# Patient Record
Sex: FEMALE | Race: WHITE | NOT HISPANIC OR LATINO | Employment: UNEMPLOYED | URBAN - NONMETROPOLITAN AREA
[De-identification: names, ages, dates, MRNs, and addresses within clinical notes are randomized per-mention and may not be internally consistent; named-entity substitution may affect disease eponyms.]

---

## 2022-02-14 ENCOUNTER — OFFICE VISIT (OUTPATIENT)
Dept: FAMILY MEDICINE CLINIC | Facility: CLINIC | Age: 32
End: 2022-02-14

## 2022-02-14 ENCOUNTER — LAB (OUTPATIENT)
Dept: LAB | Facility: HOSPITAL | Age: 32
End: 2022-02-14

## 2022-02-14 ENCOUNTER — PATIENT ROUNDING (BHMG ONLY) (OUTPATIENT)
Dept: FAMILY MEDICINE CLINIC | Facility: CLINIC | Age: 32
End: 2022-02-14

## 2022-02-14 VITALS
HEIGHT: 65 IN | BODY MASS INDEX: 22.89 KG/M2 | OXYGEN SATURATION: 98 % | HEART RATE: 62 BPM | TEMPERATURE: 97.4 F | SYSTOLIC BLOOD PRESSURE: 120 MMHG | WEIGHT: 137.4 LBS | DIASTOLIC BLOOD PRESSURE: 80 MMHG

## 2022-02-14 DIAGNOSIS — Z87.42 HISTORY OF OVARIAN CYST: ICD-10-CM

## 2022-02-14 DIAGNOSIS — Z87.59 HISTORY OF MISCARRIAGE: ICD-10-CM

## 2022-02-14 DIAGNOSIS — N97.9 INFERTILITY, FEMALE: ICD-10-CM

## 2022-02-14 DIAGNOSIS — N92.1 MENOMETRORRHAGIA: Primary | ICD-10-CM

## 2022-02-14 DIAGNOSIS — N94.6 DYSMENORRHEA: ICD-10-CM

## 2022-02-14 DIAGNOSIS — F43.29 STRESS AND ADJUSTMENT REACTION: ICD-10-CM

## 2022-02-14 DIAGNOSIS — N92.1 MENOMETRORRHAGIA: ICD-10-CM

## 2022-02-14 DIAGNOSIS — Z86.39 HISTORY OF IRON DEFICIENCY: ICD-10-CM

## 2022-02-14 LAB
ALBUMIN SERPL-MCNC: 4.4 G/DL (ref 3.5–5.2)
ALBUMIN/GLOB SERPL: 2.1 G/DL
ALP SERPL-CCNC: 58 U/L (ref 39–117)
ALT SERPL W P-5'-P-CCNC: 14 U/L (ref 1–33)
ANION GAP SERPL CALCULATED.3IONS-SCNC: 7.1 MMOL/L (ref 5–15)
AST SERPL-CCNC: 15 U/L (ref 1–32)
B-HCG UR QL: NEGATIVE
BASOPHILS # BLD AUTO: 0.05 10*3/MM3 (ref 0–0.2)
BASOPHILS NFR BLD AUTO: 0.9 % (ref 0–1.5)
BILIRUB SERPL-MCNC: 0.2 MG/DL (ref 0–1.2)
BILIRUB UR QL STRIP: NEGATIVE
BUN SERPL-MCNC: 12 MG/DL (ref 6–20)
BUN/CREAT SERPL: 17.1 (ref 7–25)
CALCIUM SPEC-SCNC: 9.3 MG/DL (ref 8.6–10.5)
CHLORIDE SERPL-SCNC: 106 MMOL/L (ref 98–107)
CLARITY UR: CLEAR
CO2 SERPL-SCNC: 28.9 MMOL/L (ref 22–29)
COLOR UR: ABNORMAL
CREAT SERPL-MCNC: 0.7 MG/DL (ref 0.57–1)
DEPRECATED RDW RBC AUTO: 46.1 FL (ref 37–54)
EOSINOPHIL # BLD AUTO: 0.07 10*3/MM3 (ref 0–0.4)
EOSINOPHIL NFR BLD AUTO: 1.3 % (ref 0.3–6.2)
ERYTHROCYTE [DISTWIDTH] IN BLOOD BY AUTOMATED COUNT: 14.4 % (ref 12.3–15.4)
ESTRADIOL SERPL HS-MCNC: 458 PG/ML
FSH SERPL-ACNC: 3.27 MIU/ML
GFR SERPL CREATININE-BSD FRML MDRD: 96 ML/MIN/1.73
GLOBULIN UR ELPH-MCNC: 2.1 GM/DL
GLUCOSE SERPL-MCNC: 82 MG/DL (ref 65–99)
GLUCOSE UR STRIP-MCNC: NEGATIVE MG/DL
HCT VFR BLD AUTO: 33.9 % (ref 34–46.6)
HGB BLD-MCNC: 10.9 G/DL (ref 12–15.9)
HGB UR QL STRIP.AUTO: NEGATIVE
IMM GRANULOCYTES # BLD AUTO: 0.01 10*3/MM3 (ref 0–0.05)
IMM GRANULOCYTES NFR BLD AUTO: 0.2 % (ref 0–0.5)
IRON 24H UR-MRATE: 14 MCG/DL (ref 37–145)
IRON SATN MFR SERPL: 3 % (ref 20–50)
KETONES UR QL STRIP: ABNORMAL
LEUKOCYTE ESTERASE UR QL STRIP.AUTO: NEGATIVE
LH SERPL-ACNC: 7.43 MIU/ML
LYMPHOCYTES # BLD AUTO: 1.76 10*3/MM3 (ref 0.7–3.1)
LYMPHOCYTES NFR BLD AUTO: 32.7 % (ref 19.6–45.3)
MCH RBC QN AUTO: 28.5 PG (ref 26.6–33)
MCHC RBC AUTO-ENTMCNC: 32.2 G/DL (ref 31.5–35.7)
MCV RBC AUTO: 88.7 FL (ref 79–97)
MONOCYTES # BLD AUTO: 0.73 10*3/MM3 (ref 0.1–0.9)
MONOCYTES NFR BLD AUTO: 13.5 % (ref 5–12)
NEUTROPHILS NFR BLD AUTO: 2.77 10*3/MM3 (ref 1.7–7)
NEUTROPHILS NFR BLD AUTO: 51.4 % (ref 42.7–76)
NITRITE UR QL STRIP: NEGATIVE
NRBC BLD AUTO-RTO: 0 /100 WBC (ref 0–0.2)
PH UR STRIP.AUTO: 6 [PH] (ref 5–8)
PLATELET # BLD AUTO: 284 10*3/MM3 (ref 140–450)
PMV BLD AUTO: 10.6 FL (ref 6–12)
POTASSIUM SERPL-SCNC: 4.3 MMOL/L (ref 3.5–5.2)
PROGEST SERPL-MCNC: <0.05 NG/ML
PROLACTIN SERPL-MCNC: 12.7 NG/ML (ref 4.79–23.3)
PROT SERPL-MCNC: 6.5 G/DL (ref 6–8.5)
PROT UR QL STRIP: NEGATIVE
RBC # BLD AUTO: 3.82 10*6/MM3 (ref 3.77–5.28)
SODIUM SERPL-SCNC: 142 MMOL/L (ref 136–145)
SP GR UR STRIP: 1.02 (ref 1–1.03)
TIBC SERPL-MCNC: 402 MCG/DL (ref 298–536)
TRANSFERRIN SERPL-MCNC: 270 MG/DL (ref 200–360)
TSH SERPL DL<=0.05 MIU/L-ACNC: 3.79 UIU/ML (ref 0.27–4.2)
UROBILINOGEN UR QL STRIP: ABNORMAL
WBC NRBC COR # BLD: 5.39 10*3/MM3 (ref 3.4–10.8)

## 2022-02-14 PROCEDURE — 36415 COLL VENOUS BLD VENIPUNCTURE: CPT | Performed by: FAMILY MEDICINE

## 2022-02-14 PROCEDURE — 83540 ASSAY OF IRON: CPT

## 2022-02-14 PROCEDURE — 80053 COMPREHEN METABOLIC PANEL: CPT

## 2022-02-14 PROCEDURE — 84443 ASSAY THYROID STIM HORMONE: CPT | Performed by: FAMILY MEDICINE

## 2022-02-14 PROCEDURE — 81025 URINE PREGNANCY TEST: CPT

## 2022-02-14 PROCEDURE — 83001 ASSAY OF GONADOTROPIN (FSH): CPT | Performed by: FAMILY MEDICINE

## 2022-02-14 PROCEDURE — 81003 URINALYSIS AUTO W/O SCOPE: CPT

## 2022-02-14 PROCEDURE — 84146 ASSAY OF PROLACTIN: CPT | Performed by: FAMILY MEDICINE

## 2022-02-14 PROCEDURE — 82670 ASSAY OF TOTAL ESTRADIOL: CPT

## 2022-02-14 PROCEDURE — 85025 COMPLETE CBC W/AUTO DIFF WBC: CPT | Performed by: FAMILY MEDICINE

## 2022-02-14 PROCEDURE — 84144 ASSAY OF PROGESTERONE: CPT

## 2022-02-14 PROCEDURE — 99204 OFFICE O/P NEW MOD 45 MIN: CPT | Performed by: FAMILY MEDICINE

## 2022-02-14 PROCEDURE — 84466 ASSAY OF TRANSFERRIN: CPT

## 2022-02-14 PROCEDURE — 83002 ASSAY OF GONADOTROPIN (LH): CPT | Performed by: FAMILY MEDICINE

## 2022-02-14 PROCEDURE — 84402 ASSAY OF FREE TESTOSTERONE: CPT | Performed by: FAMILY MEDICINE

## 2022-02-14 PROCEDURE — 82627 DEHYDROEPIANDROSTERONE: CPT | Performed by: FAMILY MEDICINE

## 2022-02-14 RX ORDER — MULTIPLE VITAMINS W/ MINERALS TAB 9MG-400MCG
1 TAB ORAL DAILY
COMMUNITY

## 2022-02-14 NOTE — PROGRESS NOTES
February 14, 2022    Hello, may I speak with Lisa Manrique?    My name is Pam    I am  with [facility]  [address] [place] [id number] [phone].    Before we get started may I verify your date of birth? 1990    I am calling to officially welcome you to our practice and ask about your recent visit. Is this a good time to talk? yes    Tell me about your visit with us. What things went well?  very nice provider       We're always looking for ways to make our patients' experiences even better. Do you have recommendations on ways we may improve?  no    Overall were you satisfied with your first visit to our practice? yes       I appreciate you taking the time to speak with me today. Is there anything else I can do for you? no      Thank you, and have a great day.

## 2022-02-14 NOTE — PROGRESS NOTES
Chief Complaint  Establish Care (would like hormone check up)    Subjective        History of Present Illness  Lisa Manrique is a 31 y.o. female who presents to [facility] - new patient.    Lisa is here with some heavy bleeding and irregular periods. She is requesting some hormone checks. She has had IVF when she was living [place] in [place]. She is taking a multivitamin with minerals as her only medication. She has allergy to PENICILLIN.    Her  was 2019 and she gave birth to her son in [place]; she is not established here with an OB-GYN. On her family history, there is diabetes in her grandmother and kidney disease and obesity as well as high blood pressure in her father. Socially, she has never been a drinker or smoker and denies any use of any illicit drugs.    She reports being diagnosed with endometriosis at age 24, and described pain and missing work due to her menses. She was started on birth control at that time. The patient was found on ultrasound to have 1 large and 2 small ovarian cysts, and states the large cyst ruptured and approximately 1 year later one of the smaller ones ruptured. The patient stopped taking birth control 3 years later when she and her  wanted to start a family. While in [facility] she had a hysteroscopy and a hystosalpingogram and everything was fine. The patient notes she has always had regular cycles. She has not tracked ovulation because it causes too much stress. The patient has 1 child that is 3 years old. Her last of 4 embryos were implanted in 2021; none of them worked. . She reports at the beginning of 2021 she had a miscarriage at 5 weeks. The patient states she is taking a multivitamin and has not been iron deficient since high school.  She notes with this current cycle that 10 days after starting her period she is still having spotting.    Past Medical History:   Diagnosis Date   • Menstrual problem   "      Outpatient Medications Prior to Visit   Medication Sig Dispense Refill   • multivitamin with minerals (ONE-A-DAY WOMENS PO) Take 1 tablet by mouth Daily.       No facility-administered medications prior to visit.        Objective   Vital Signs:   /80 (BP Location: Left arm, Patient Position: Sitting, Cuff Size: Adult)   Pulse 62   Temp 97.4 °F (36.3 °C) (Temporal)   Ht 165.1 cm (65\")   Wt 62.3 kg (137 lb 6.4 oz)   SpO2 98%   BMI 22.86 kg/m²       Physical Exam  Vitals reviewed.   Constitutional:       Appearance: Normal appearance. She is not ill-appearing.      Comments: Slender woman   Eyes:      Extraocular Movements: Extraocular movements intact.      Conjunctiva/sclera: Conjunctivae normal.   Neck:      Comments: Normal thyroid exam  Cardiovascular:      Rate and Rhythm: Normal rate and regular rhythm.   Pulmonary:      Effort: Pulmonary effort is normal.      Breath sounds: Normal breath sounds.   Abdominal:      General: There is no distension.   Musculoskeletal:      Cervical back: No tenderness.      Comments: Gen MAEW   Lymphadenopathy:      Cervical: No cervical adenopathy.   Skin:     General: Skin is warm.      Coloration: Skin is not pale.   Neurological:      Mental Status: She is alert and oriented to person, place, and time.   Psychiatric:         Attention and Perception: Attention normal.         Mood and Affect: Mood is anxious and depressed. Affect is tearful.         Behavior: Behavior normal. Behavior is cooperative.         Thought Content: Thought content normal.         Cognition and Memory: Cognition and memory normal.         Judgment: Judgment normal.             Assessment and Plan    Diagnoses and all orders for this visit:    1. Menometrorrhagia (Primary)  -     Pregnancy, Urine - Urine, Clean Catch; Future  -     Urinalysis With Culture If Indicated - Urine, Clean Catch; Future  -     Estradiol; Future  -     Progesterone; Future  -     CBC auto differential  -     " DHEA-sulfate  -     Follicle stimulating hormone  -     Luteinizing hormone  -     Prolactin  -     Testosterone Free Direct  -     TSH  -     Comprehensive Metabolic Panel; Future    2. Dysmenorrhea  -     Pregnancy, Urine - Urine, Clean Catch; Future  -     Urinalysis With Culture If Indicated - Urine, Clean Catch; Future  -     Estradiol; Future  -     Progesterone; Future  -     CBC auto differential  -     DHEA-sulfate  -     Follicle stimulating hormone  -     Luteinizing hormone  -     Prolactin  -     Testosterone Free Direct  -     TSH  -     Comprehensive Metabolic Panel; Future    3. Infertility, female  -     Pregnancy, Urine - Urine, Clean Catch; Future  -     Urinalysis With Culture If Indicated - Urine, Clean Catch; Future  -     Estradiol; Future  -     Progesterone; Future  -     CBC auto differential  -     DHEA-sulfate  -     Follicle stimulating hormone  -     Luteinizing hormone  -     Prolactin  -     Testosterone Free Direct  -     TSH  -     Comprehensive Metabolic Panel; Future    4. History of miscarriage  -     Pregnancy, Urine - Urine, Clean Catch; Future  -     Urinalysis With Culture If Indicated - Urine, Clean Catch; Future  -     Estradiol; Future  -     Progesterone; Future  -     CBC auto differential  -     DHEA-sulfate  -     Follicle stimulating hormone  -     Luteinizing hormone  -     Prolactin  -     Testosterone Free Direct  -     TSH  -     Comprehensive Metabolic Panel; Future    5. Stress and adjustment reaction    6. History of ovarian cyst  -     Pregnancy, Urine - Urine, Clean Catch; Future  -     Urinalysis With Culture If Indicated - Urine, Clean Catch; Future  -     Estradiol; Future  -     Progesterone; Future  -     CBC auto differential  -     DHEA-sulfate  -     Follicle stimulating hormone  -     Luteinizing hormone  -     Prolactin  -     Testosterone Free Direct  -     TSH  -     Comprehensive Metabolic Panel; Future    7. History of iron deficiency  -      Iron Profile; Future    She will see Dr. Arnett for OB/GYN evaluation. I will forward her the lab results in the mail so she can take them with her to her appointment. If there are any issues as far as thyroid or iron deficiency or other medical issues, she will follow up here as directed and as indicated by the lab results. It is unclear if she has endometriosis. The status of her ovarian cyst is unknown and she will likely need ultrasound. I have urged her to continue her counseling that she has been doing online through a [employer] group, and I have told her to follow up here as needed for any acute anxiety or persistent depressive symptoms, especially in the case of miscarriage in the future.       I spent 45 minutes caring for Lisa on this date of service. This time includes time spent by me in the following activities:performing a medically appropriate examination and/or evaluation , counseling and educating the patient/family/caregiver, ordering medications, tests, or procedures, referring and communicating with other health care professionals  and care coordination    Follow Up   Return if symptoms worsen or fail to improve.    Patient was given instructions and counseling regarding her condition or for health maintenance advice.        Please see specific information/handouts pulled into the AVS if appropriate.       Una Arroyo M.D.  [facility]   Family Medicine    Transcribed from ambient dictation for Una Arroyo MD by Shannon Dietz.  02/14/22   10:42 CST    Patient verbalized consent to the visit recording.

## 2022-02-15 LAB
DHEA-S SERPL-MCNC: 69.8 UG/DL (ref 84.8–378)
TESTOST FREE SERPL-MCNC: 0.3 PG/ML (ref 0–4.2)

## 2022-05-24 ENCOUNTER — TRANSCRIBE ORDERS (OUTPATIENT)
Dept: ADMINISTRATIVE | Facility: HOSPITAL | Age: 32
End: 2022-05-24

## 2022-05-24 DIAGNOSIS — Z31.41 FERTILITY TESTING: Primary | ICD-10-CM

## 2022-05-25 ENCOUNTER — APPOINTMENT (OUTPATIENT)
Dept: GENERAL RADIOLOGY | Facility: HOSPITAL | Age: 32
End: 2022-05-25

## 2022-05-31 ENCOUNTER — HOSPITAL ENCOUNTER (OUTPATIENT)
Dept: GENERAL RADIOLOGY | Facility: HOSPITAL | Age: 32
Discharge: HOME | End: 2022-05-31
Admitting: OBSTETRICS & GYNECOLOGY

## 2022-05-31 DIAGNOSIS — Z31.41 FERTILITY TESTING: ICD-10-CM

## 2022-05-31 PROCEDURE — 25010000002 IOPAMIDOL 61 % SOLUTION: Performed by: OBSTETRICS & GYNECOLOGY

## 2022-05-31 PROCEDURE — 74740 X-RAY FEMALE GENITAL TRACT: CPT

## 2022-05-31 RX ADMIN — IOPAMIDOL 15 ML: 612 INJECTION, SOLUTION INTRATHECAL at 08:35

## 2023-08-03 ENCOUNTER — LAB (OUTPATIENT)
Dept: LAB | Facility: HOSPITAL | Age: 33
End: 2023-08-03
Payer: COMMERCIAL

## 2023-08-03 DIAGNOSIS — N92.1 MENOMETRORRHAGIA: ICD-10-CM

## 2023-08-03 DIAGNOSIS — Z00.00 ANNUAL PHYSICAL EXAM: ICD-10-CM

## 2023-08-03 DIAGNOSIS — N97.9 INFERTILITY, FEMALE: ICD-10-CM

## 2023-08-03 LAB
ALBUMIN SERPL-MCNC: 4.6 G/DL (ref 3.5–5.2)
ALBUMIN/GLOB SERPL: 2.2 G/DL
ALP SERPL-CCNC: 47 U/L (ref 39–117)
ALT SERPL W P-5'-P-CCNC: 12 U/L (ref 1–33)
ANION GAP SERPL CALCULATED.3IONS-SCNC: 10 MMOL/L (ref 5–15)
AST SERPL-CCNC: 13 U/L (ref 1–32)
BASOPHILS # BLD AUTO: 0.06 10*3/MM3 (ref 0–0.2)
BASOPHILS NFR BLD AUTO: 0.8 % (ref 0–1.5)
BILIRUB SERPL-MCNC: 0.4 MG/DL (ref 0–1.2)
BUN SERPL-MCNC: 15 MG/DL (ref 6–20)
BUN/CREAT SERPL: 22.1 (ref 7–25)
CALCIUM SPEC-SCNC: 9.3 MG/DL (ref 8.6–10.5)
CHLORIDE SERPL-SCNC: 103 MMOL/L (ref 98–107)
CHOLEST SERPL-MCNC: 145 MG/DL (ref 0–200)
CO2 SERPL-SCNC: 27 MMOL/L (ref 22–29)
CREAT SERPL-MCNC: 0.68 MG/DL (ref 0.57–1)
DEPRECATED RDW RBC AUTO: 45.4 FL (ref 37–54)
EGFRCR SERPLBLD CKD-EPI 2021: 117.4 ML/MIN/1.73
EOSINOPHIL # BLD AUTO: 0.07 10*3/MM3 (ref 0–0.4)
EOSINOPHIL NFR BLD AUTO: 1 % (ref 0.3–6.2)
ERYTHROCYTE [DISTWIDTH] IN BLOOD BY AUTOMATED COUNT: 12.8 % (ref 12.3–15.4)
GLOBULIN UR ELPH-MCNC: 2.1 GM/DL
GLUCOSE SERPL-MCNC: 142 MG/DL (ref 65–99)
HBA1C MFR BLD: 5 % (ref 4.8–5.6)
HCT VFR BLD AUTO: 35.4 % (ref 34–46.6)
HDLC SERPL-MCNC: 74 MG/DL (ref 40–60)
HGB BLD-MCNC: 11.1 G/DL (ref 12–15.9)
IMM GRANULOCYTES # BLD AUTO: 0.03 10*3/MM3 (ref 0–0.05)
IMM GRANULOCYTES NFR BLD AUTO: 0.4 % (ref 0–0.5)
LDLC SERPL CALC-MCNC: 61 MG/DL (ref 0–100)
LDLC/HDLC SERPL: 0.84 {RATIO}
LYMPHOCYTES # BLD AUTO: 2.64 10*3/MM3 (ref 0.7–3.1)
LYMPHOCYTES NFR BLD AUTO: 36.8 % (ref 19.6–45.3)
MCH RBC QN AUTO: 30.2 PG (ref 26.6–33)
MCHC RBC AUTO-ENTMCNC: 31.4 G/DL (ref 31.5–35.7)
MCV RBC AUTO: 96.2 FL (ref 79–97)
MONOCYTES # BLD AUTO: 0.47 10*3/MM3 (ref 0.1–0.9)
MONOCYTES NFR BLD AUTO: 6.5 % (ref 5–12)
NEUTROPHILS NFR BLD AUTO: 3.91 10*3/MM3 (ref 1.7–7)
NEUTROPHILS NFR BLD AUTO: 54.5 % (ref 42.7–76)
NRBC BLD AUTO-RTO: 0 /100 WBC (ref 0–0.2)
PLATELET # BLD AUTO: 271 10*3/MM3 (ref 140–450)
PMV BLD AUTO: 9.5 FL (ref 6–12)
POTASSIUM SERPL-SCNC: 4.1 MMOL/L (ref 3.5–5.2)
PROT SERPL-MCNC: 6.7 G/DL (ref 6–8.5)
RBC # BLD AUTO: 3.68 10*6/MM3 (ref 3.77–5.28)
SODIUM SERPL-SCNC: 140 MMOL/L (ref 136–145)
T4 FREE SERPL-MCNC: 1.24 NG/DL (ref 0.93–1.7)
TRIGL SERPL-MCNC: 45 MG/DL (ref 0–150)
TSH SERPL DL<=0.05 MIU/L-ACNC: 2.26 UIU/ML (ref 0.27–4.2)
VLDLC SERPL-MCNC: 10 MG/DL (ref 5–40)
WBC NRBC COR # BLD: 7.18 10*3/MM3 (ref 3.4–10.8)

## 2023-08-03 PROCEDURE — 83036 HEMOGLOBIN GLYCOSYLATED A1C: CPT

## 2023-08-03 PROCEDURE — 82670 ASSAY OF TOTAL ESTRADIOL: CPT

## 2023-08-03 PROCEDURE — 82626 DEHYDROEPIANDROSTERONE: CPT

## 2023-08-03 PROCEDURE — 80053 COMPREHEN METABOLIC PANEL: CPT

## 2023-08-03 PROCEDURE — 83001 ASSAY OF GONADOTROPIN (FSH): CPT

## 2023-08-03 PROCEDURE — 83002 ASSAY OF GONADOTROPIN (LH): CPT

## 2023-08-03 PROCEDURE — 84403 ASSAY OF TOTAL TESTOSTERONE: CPT

## 2023-08-03 PROCEDURE — 84443 ASSAY THYROID STIM HORMONE: CPT

## 2023-08-03 PROCEDURE — 85025 COMPLETE CBC W/AUTO DIFF WBC: CPT

## 2023-08-03 PROCEDURE — 84146 ASSAY OF PROLACTIN: CPT

## 2023-08-03 PROCEDURE — 84439 ASSAY OF FREE THYROXINE: CPT

## 2023-08-03 PROCEDURE — 80061 LIPID PANEL: CPT

## 2023-08-03 PROCEDURE — 36415 COLL VENOUS BLD VENIPUNCTURE: CPT

## 2023-08-04 LAB
ESTRADIOL SERPL HS-MCNC: 192 PG/ML
FSH SERPL-ACNC: 7.53 MIU/ML
LH SERPL-ACNC: 31.4 MIU/ML
PROLACTIN SERPL-MCNC: 24.2 NG/ML (ref 4.79–23.3)
TESTOST SERPL-MCNC: 17.9 NG/DL (ref 8.4–48.1)

## 2023-08-09 LAB — DHEA SERPL-MCNC: 237 NG/DL (ref 31–701)

## 2023-09-05 ENCOUNTER — OFFICE VISIT (OUTPATIENT)
Dept: OBSTETRICS AND GYNECOLOGY | Facility: CLINIC | Age: 33
End: 2023-09-05
Payer: COMMERCIAL

## 2023-09-05 VITALS
HEIGHT: 65 IN | WEIGHT: 134 LBS | DIASTOLIC BLOOD PRESSURE: 62 MMHG | BODY MASS INDEX: 22.33 KG/M2 | SYSTOLIC BLOOD PRESSURE: 100 MMHG

## 2023-09-05 DIAGNOSIS — N97.9 SECONDARY FEMALE INFERTILITY: Primary | ICD-10-CM

## 2023-09-05 PROCEDURE — 99203 OFFICE O/P NEW LOW 30 MIN: CPT | Performed by: OBSTETRICS & GYNECOLOGY

## 2023-09-05 NOTE — PROGRESS NOTES
"Lisa Manrique is a 32 y.o. female here today to discuss treatment of infertility.  She has had 1 live birth, conceived with IVF.  Her most recent pregnancy was a early first trimester miscarriage in April 2021.  Several years ago, she was presumptively diagnosed with endometriosis, but has not had laparoscopy.  She moved to [facility] January 2022, and saw another provider in [facility].  She reportedly had a fertility evaluation including normal semen analysis and HSG.  I have been able to review electronic records showing a normal HSG May 2022.  Her PCP ordered randomly timed labs, and I have been able to review those results from August 3.  She was midcycle at the time, and TSH, FSH, LH, estradiol, and progesterone were normal or as expected given the time of the menstrual cycle.  Her prolactin was mildly elevated, but has previously been normal.  It has also been normal on repeat.    Visit Vitals  /62 (BP Location: Right arm, Patient Position: Sitting, Cuff Size: Adult)   Ht 165.1 cm (65\")   Wt 60.8 kg (134 lb)   Breastfeeding No   BMI 22.30 kg/m²     Pleasant female no acute distress  Mood and affect normal  Breathing unlabored    She reports a positive home ovulation predictor kit on August 31    Assessment: Secondary female infertility    Given her stage of the menstrual cycle, she will return in 3 days for a progesterone level to assess ovulation.  She will then return on cycle day 3 for FSH, estradiol, and AMH.  She will return to the office in 3 weeks to review results and discuss possible therapy.  This may include ovulation augmentation with Femara, luteal phase progesterone supplementation, or other treatment.  Since she has required IVF in the past, we discussed the possibility of returning to reproductive endocrinology for further fertility treatment.  Call with questions or concerns.           "

## 2023-09-08 ENCOUNTER — LAB (OUTPATIENT)
Dept: LAB | Facility: HOSPITAL | Age: 33
End: 2023-09-08
Payer: COMMERCIAL

## 2023-09-08 ENCOUNTER — TELEPHONE (OUTPATIENT)
Dept: OBSTETRICS AND GYNECOLOGY | Facility: CLINIC | Age: 33
End: 2023-09-08
Payer: COMMERCIAL

## 2023-09-08 DIAGNOSIS — N97.9 SECONDARY FEMALE INFERTILITY: Primary | ICD-10-CM

## 2023-09-08 PROCEDURE — 36415 COLL VENOUS BLD VENIPUNCTURE: CPT

## 2023-09-08 PROCEDURE — 84144 ASSAY OF PROGESTERONE: CPT | Performed by: OBSTETRICS & GYNECOLOGY

## 2023-09-09 LAB — PROGEST SERPL-MCNC: 37.7 NG/ML

## 2023-09-11 ENCOUNTER — TELEPHONE (OUTPATIENT)
Dept: OBSTETRICS AND GYNECOLOGY | Facility: CLINIC | Age: 33
End: 2023-09-11
Payer: COMMERCIAL

## 2023-09-11 DIAGNOSIS — N97.9 FEMALE INFERTILITY: Primary | ICD-10-CM

## 2023-09-11 NOTE — TELEPHONE ENCOUNTER
Pt left v/m with questions regarding her Progesterone level. Pt states on her v/m that we can leave a detailed message if she doesn't answer. Pt did not answer on call back so I left a v/m stating per Dr. Cheng, her Progesterone level was great and for her to return on CD 3 for additional labs. Pt advised to call office with any further questions.

## 2023-09-11 NOTE — TELEPHONE ENCOUNTER
Pt called back with questions related to her Progesterone results. I answered pt's questions accordingly and advised her to come in for additional labs on CD 3

## 2023-09-21 LAB
ESTRADIOL SERPL-MCNC: 69.3 PG/ML
FSH SERPL-ACNC: 6.3 MIU/ML
MIS SERPL-MCNC: 1.09 NG/ML

## 2023-10-09 ENCOUNTER — OFFICE VISIT (OUTPATIENT)
Dept: OBSTETRICS AND GYNECOLOGY | Facility: CLINIC | Age: 33
End: 2023-10-09
Payer: COMMERCIAL

## 2023-10-09 VITALS
DIASTOLIC BLOOD PRESSURE: 72 MMHG | SYSTOLIC BLOOD PRESSURE: 114 MMHG | BODY MASS INDEX: 21.66 KG/M2 | WEIGHT: 130 LBS | HEIGHT: 65 IN

## 2023-10-09 DIAGNOSIS — N97.9 SECONDARY FEMALE INFERTILITY: Primary | ICD-10-CM

## 2023-10-09 PROCEDURE — 99213 OFFICE O/P EST LOW 20 MIN: CPT | Performed by: OBSTETRICS & GYNECOLOGY

## 2023-10-09 NOTE — PROGRESS NOTES
"Lisa Manrique is a 32 y.o. female here today for follow-up of infertility.  Day 21 progesterone showed good evidence of ovulation.  Day 3 labs show good ovarian reserve.  She achieved her last pregnancy with IVF in [place].  Other serum labs have previously been normal.  Reported history of normal HSG and semen analysis as well.    Visit Vitals  /72   Ht 165.1 cm (65\")   Wt 59 kg (130 lb)   BMI 21.63 kg/mý     Pleasant female no acute distress  Mood and affect normal  Breathing unlabored    Assessment: Secondary female infertility    We had a fairly lengthy discussion about treatment options.  She required IVF to get pregnant previously, and we discussed the limitations of fertility treatments in a general gynecology office.  She may opt to see reproductive endocrinology at any time.  Options that we offer here include ovulation augmentation with intrauterine insemination, and potential ultrasound to follow follicular development.  She will consider her options, and discussed with her .  She will contact the office if she would like to move forward.  Call with questions or concerns.      "

## 2023-10-10 ENCOUNTER — PATIENT MESSAGE (OUTPATIENT)
Dept: OBSTETRICS AND GYNECOLOGY | Facility: CLINIC | Age: 33
End: 2023-10-10
Payer: COMMERCIAL

## 2023-10-11 ENCOUNTER — TELEPHONE (OUTPATIENT)
Dept: OBSTETRICS AND GYNECOLOGY | Facility: CLINIC | Age: 33
End: 2023-10-11
Payer: COMMERCIAL

## 2023-10-11 NOTE — TELEPHONE ENCOUNTER
I see there is a message forwarded to Dr. Cheng regarding this pt but she called this afternoon stating that today is day 1 of her cycle and they would like to proceed with IUI this cycle if possible and she didn't want to miss the window of taking any medication.

## 2023-10-12 RX ORDER — LETROZOLE 2.5 MG/1
5 TABLET, FILM COATED ORAL DAILY
Qty: 10 TABLET | Refills: 0 | Status: SHIPPED | OUTPATIENT
Start: 2023-10-12

## 2023-10-20 ENCOUNTER — PATIENT MESSAGE (OUTPATIENT)
Dept: OBSTETRICS AND GYNECOLOGY | Facility: CLINIC | Age: 33
End: 2023-10-20
Payer: COMMERCIAL

## 2023-10-20 RX ORDER — CHORIOGONADOTROPIN ALFA 250 UG/.5ML
250 INJECTION, SOLUTION SUBCUTANEOUS ONCE
Qty: 0.5 ML | Refills: 0 | Status: SHIPPED | OUTPATIENT
Start: 2023-10-20 | End: 2023-10-20

## 2023-10-20 NOTE — TELEPHONE ENCOUNTER
From: Lisa Manrique  Sent: 10/20/2023 2:33 PM CDT  To: [facility] Clinical Pool  Subject: follicle check    Yes, I should be able to. Thank you. Does it have instructions if there need to be any? And the injection will be ready for me to  today?

## 2023-10-23 ENCOUNTER — PROCEDURE VISIT (OUTPATIENT)
Dept: OBSTETRICS AND GYNECOLOGY | Facility: CLINIC | Age: 33
End: 2023-10-23
Payer: COMMERCIAL

## 2023-10-23 VITALS
HEIGHT: 65 IN | DIASTOLIC BLOOD PRESSURE: 74 MMHG | WEIGHT: 130 LBS | SYSTOLIC BLOOD PRESSURE: 108 MMHG | BODY MASS INDEX: 21.66 KG/M2

## 2023-10-23 DIAGNOSIS — N97.9 SECONDARY FEMALE INFERTILITY: Primary | ICD-10-CM

## 2023-10-23 RX ORDER — PROGESTERONE 200 MG/1
200 CAPSULE ORAL DAILY
Qty: 30 CAPSULE | Refills: 2 | Status: SHIPPED | OUTPATIENT
Start: 2023-10-23

## 2023-10-23 NOTE — PROGRESS NOTES
Sperm wash done, adequate sperm noted under the microscope. Pt is placed in stirrups and speculum inserted. Cervix is dilated with cytobrush. Sperm is slowly injected into the uterus. Pt is placed in trendelenburg for 45 minutes. Will wait 2 weeks before doing a pregnancy test.

## 2023-11-06 ENCOUNTER — TELEPHONE (OUTPATIENT)
Dept: OBSTETRICS AND GYNECOLOGY | Facility: CLINIC | Age: 33
End: 2023-11-06
Payer: COMMERCIAL

## 2023-11-06 NOTE — TELEPHONE ENCOUNTER
Pt had IUI in office 10/23/23. She had a negative UPT this morning but still has not started her cycle. Pt is asking if she should continue taking the Progesterone and test again in a couple of days or discontinue the Progesterone? Please advise

## 2023-11-08 DIAGNOSIS — N97.9 SECONDARY FEMALE INFERTILITY: Primary | ICD-10-CM

## 2023-11-08 NOTE — TELEPHONE ENCOUNTER
Pt had IUI on 10/23/23.  Pt called today after starting her period.  Pt is asking to repeat femara with this cycle and attempt IUI again.  Medication pended if appropriate.  I did advise pt that I will call and update her tomorrow 11/9/23.

## 2023-11-09 RX ORDER — LETROZOLE 2.5 MG/1
5 TABLET, FILM COATED ORAL DAILY
Qty: 10 TABLET | Refills: 0 | Status: SHIPPED | OUTPATIENT
Start: 2023-11-09

## 2023-11-17 ENCOUNTER — TELEPHONE (OUTPATIENT)
Dept: OBSTETRICS AND GYNECOLOGY | Facility: CLINIC | Age: 33
End: 2023-11-17
Payer: COMMERCIAL

## 2023-11-17 NOTE — TELEPHONE ENCOUNTER
Pt updated after discussion with Dr hCeng.  Dr Cheng advises pt to use ovulation predictor strips Saturday and Sunday and if positive to have intercourse.  Pt to update office on Monday morning if no positive test over the weekend, will evaluate the situation on Monday and update Dr Cheng.  Pt voiced understanding to all conversation.

## 2023-11-20 ENCOUNTER — PROCEDURE VISIT (OUTPATIENT)
Dept: OBSTETRICS AND GYNECOLOGY | Facility: CLINIC | Age: 33
End: 2023-11-20
Payer: COMMERCIAL

## 2023-11-20 ENCOUNTER — TELEPHONE (OUTPATIENT)
Dept: OBSTETRICS AND GYNECOLOGY | Facility: CLINIC | Age: 33
End: 2023-11-20
Payer: COMMERCIAL

## 2023-11-20 VITALS
DIASTOLIC BLOOD PRESSURE: 78 MMHG | WEIGHT: 132 LBS | SYSTOLIC BLOOD PRESSURE: 118 MMHG | HEIGHT: 65 IN | BODY MASS INDEX: 21.99 KG/M2

## 2023-11-20 DIAGNOSIS — Z31.89 ENCOUNTER FOR ARTIFICIAL INSEMINATION: Primary | ICD-10-CM

## 2023-11-20 NOTE — TELEPHONE ENCOUNTER
Per Lorraine, I called pt to see if she would be able to come in at 1:00pm this afternoon for IUI and there was no answer so I left a v/m

## 2023-11-20 NOTE — TELEPHONE ENCOUNTER
Pt called to update us regarding Friday's phone call(see TE). Pt states she had taken 2 OPK on Saturday, both results being 0.20. She took 2 on Sunday, the first being 0.17 and the second being 0.78. Pt advised, this information would get passed to Dr. Cheng and someone would give her a call back regarding his plan for IUI or other recommendations. Pt voiced understanding.

## 2023-11-20 NOTE — PROGRESS NOTES
Lisa Manrique is a 32 y.o. female here today for intrauterine insemination.  She had a positive LH surge yesterday by home urinary testing.  She is on Femara ovulation induction.  She has had her infertility workup through Dr. Cheng.  This is insemination #2.    The patient provided a fresh labeled semen sample for use for intrauterine insemination.  The semen sample was processed and washed using our standard laboratory procedure.  The specimen was allowed to liquefy for approximately 30 minutes, and was then labeled and transferred to a sterile centrifuge tube.  The volume was measured at 3.2 mL.  An equal volume of multipurpose handling medium was added to the centrifuge tube, and the sample mixed.  The sample was spun for 10 minutes, good pellet formation was noted, and the supernatant then discarded.  Another 2 mL of multipurpose handling medium was added to the centrifuge tube, and the specimen mixed.  The sample was spun for another 10 minutes, good pellet formation was noted, and the supernatant was then discarded.  0.3 mL multipurpose handling medium was then added to resuspend the pellett to be used for insemination.    The patient was placed in the lithotomy position on the exam table.  The cervix was visualized with a speculum.  The cervix was probed and found to be patent.  I have reviewed the slide prepared from the semen washings.  There are numerous mobile sperm noted in a fairly high concentration.  The prepared semen sample, of approximately 0.5cc, was drawn up in an insemination catheter and the catheter placed transcervically to the uterine fundus.  The entire sample was placed at the uterine fundus without difficulty.  The catheter and speculum were removed.  The patient was allowed to stay in a hips elevated position for 20 minutes prior to leaving the office.  She will notify the office in a couple of weeks if she has a period or a positive pregnancy test.

## 2023-12-05 DIAGNOSIS — N97.9 SECONDARY FEMALE INFERTILITY: Primary | ICD-10-CM

## 2023-12-05 RX ORDER — LETROZOLE 2.5 MG/1
5 TABLET, FILM COATED ORAL DAILY
Qty: 10 TABLET | Refills: 0 | Status: SHIPPED | OUTPATIENT
Start: 2023-12-05 | End: 2023-12-10

## 2023-12-05 NOTE — TELEPHONE ENCOUNTER
Pt called to report today is day 1 of cycle.  Pt asking for femara refill.   Pt also asking to schedule 10 day US for follicle check.  Please advise if appropriate for this cycle.

## 2023-12-14 ENCOUNTER — TELEPHONE (OUTPATIENT)
Dept: OBSTETRICS AND GYNECOLOGY | Facility: CLINIC | Age: 33
End: 2023-12-14
Payer: COMMERCIAL

## 2023-12-14 RX ORDER — CHORIOGONADOTROPIN ALFA 250 UG/.5ML
250 INJECTION, SOLUTION SUBCUTANEOUS ONCE
Qty: 0.5 ML | Refills: 0 | Status: SHIPPED | OUTPATIENT
Start: 2023-12-14 | End: 2023-12-15

## 2023-12-14 NOTE — TELEPHONE ENCOUNTER
Follicle check US reviewed with Dr Cheng. Pt to trigger tonight and IUI tomorrow. Pt has sterile cup for partner to collect at home. Pt given option of 8a appt or 1030a appt. She opts for 8a appt and will have partner collect around 715a so it is ready to process at appt time. Ovidrel sent to  pharmacy.

## 2023-12-15 ENCOUNTER — PROCEDURE VISIT (OUTPATIENT)
Dept: OBSTETRICS AND GYNECOLOGY | Facility: CLINIC | Age: 33
End: 2023-12-15
Payer: COMMERCIAL

## 2023-12-15 VITALS
DIASTOLIC BLOOD PRESSURE: 74 MMHG | SYSTOLIC BLOOD PRESSURE: 102 MMHG | HEIGHT: 65 IN | WEIGHT: 130 LBS | BODY MASS INDEX: 21.66 KG/M2

## 2023-12-15 DIAGNOSIS — Z31.89 ENCOUNTER FOR ARTIFICIAL INSEMINATION: Primary | ICD-10-CM

## 2023-12-15 NOTE — PROGRESS NOTES
Lisa Manrique is a 32 y.o. female here today for intrauterine insemination.  She had a mature follicle on US yesterday, and triggered last night.  She is on ovulation induction with Femara.  She has had her infertility workup through Dr. Cheng.  This is insemination #3.    The patient provided a fresh labeled semen sample for use for intrauterine insemination.  The semen sample was processed and washed using our standard laboratory procedure.  The specimen was allowed to liquefy for approximately 30 minutes, and was then labeled and transferred to a sterile centrifuge tube.  The volume was measured at 2.6 mL.  An equal volume of multipurpose handling medium was added to the centrifuge tube, and the sample mixed.  The sample was spun for 10 minutes, good pellet formation was noted, and the supernatant then discarded.  Another 2 mL of multipurpose handling medium was added to the centrifuge tube, and the specimen mixed.  The sample was spun for another 10 minutes, good pellet formation was noted, and the supernatant was then discarded.  0.3 mL multipurpose handling medium was then added to resuspend the pellett to be used for insemination.    The patient was placed in the lithotomy position on the exam table.  The cervix was visualized with a speculum.  The cervix was probed and found to be patent.  I have reviewed the slide prepared from the semen washings.  There are numerous mobile sperm noted in a fairly high concentration.  The prepared semen sample, of approximately 0.5cc, was drawn up in an insemination catheter and the catheter placed transcervically to the uterine fundus.  The entire sample was placed at the uterine fundus without difficulty.  The catheter and speculum were removed.  The patient was allowed to stay in a hips elevated position for 20 minutes prior to leaving the office.  She will notify the office in a couple of weeks if she has a period or a positive pregnancy test.

## 2024-01-26 DIAGNOSIS — N97.9 FEMALE INFERTILITY: Primary | ICD-10-CM

## 2024-01-26 RX ORDER — LETROZOLE 2.5 MG/1
5 TABLET, FILM COATED ORAL DAILY
Qty: 10 TABLET | Refills: 0 | Status: SHIPPED | OUTPATIENT
Start: 2024-01-26 | End: 2024-01-31

## 2024-01-26 NOTE — PROGRESS NOTES
Received message from patient of the need for a prescription for Femara. She has started her menstrual cycle and will need to start Femara on Sunday. Chart history and notes reviewed.     Diagnoses and all orders for this visit:    1. Female infertility (Primary)  -     letrozole (Femara) 2.5 MG tablet; Take 2 tablets by mouth Daily for 5 days. Start taking on day 3 of your menstrual cycle.  Dispense: 10 tablet; Refill: 0      Attempted to contact patient to let her know the prescription had been sent to the pharmacy. Message left simply stating a prescription had been sent to her pharmacy.       This note has been signed electronically.     Catie Burkett, DNP, APRN, CNM, RNC-OB

## 2024-01-29 ENCOUNTER — TELEPHONE (OUTPATIENT)
Dept: OBSTETRICS AND GYNECOLOGY | Age: 34
End: 2024-01-29
Payer: COMMERCIAL

## 2024-01-29 RX ORDER — ESTRADIOL 1 MG/1
1 TABLET ORAL DAILY
Qty: 6 TABLET | Refills: 0 | Status: SHIPPED | OUTPATIENT
Start: 2024-01-29

## 2024-01-29 NOTE — TELEPHONE ENCOUNTER
Per Dr Cheng:   That is something that I would give on cycle days 5 through 10.  Her endometrium was a little thin on her last follicle check ultrasound, so I probably mentioned using a little supplemental estrogen.  It is okay to send a prescription for estradiol 1 mg daily on cycle days 5 through 10, dispense 5, 0 refills     Pt informed by phone and rx sent to pharmacy.

## 2024-01-29 NOTE — TELEPHONE ENCOUNTER
Caller: Lisa Manrique    Relationship: Self    Best call back number: [phone]    What medication are you requesting: ESTROGEN    What are your current symptoms: NA    How long have you been experiencing symptoms: NA    Have you had these symptoms before:    [] Yes  [] No    Have you been treated for these symptoms before:   [] Yes  [] No    If a prescription is needed, what is your preferred pharmacy and phone number:  [facility]    Additional notes: PT STATED THAT SHE IS SUPPOSE TO START ESTROGEN PRIOR TO HER NEXT ULTRA SOUND - SHE IS ASKING WHEN THAT WILL BE CALLED IN AND WHEN SHE SHOULD START TAKING IT - THE 10TH DAY OF HER CYCLE IS 2/4/24 - U/S SCHEDULED FOR 2/5/241  PLEASE CALL PT TO DISCUSS/ADVISE

## 2024-01-29 NOTE — TELEPHONE ENCOUNTER
Please review telephone encounter. I may be overlooking a note in her chart about the use of estrogen for infertility treatment.

## 2024-02-05 RX ORDER — CHORIOGONADOTROPIN ALFA 250 UG/.5ML
250 INJECTION, SOLUTION SUBCUTANEOUS ONCE
Qty: 0.5 ML | Refills: 0 | Status: SHIPPED | OUTPATIENT
Start: 2024-02-05 | End: 2024-02-06

## 2024-02-05 NOTE — TELEPHONE ENCOUNTER
Follicle check ultrasound today. Per Dr Cheng, pt to trigger this afternoon and IUI tomorrow. Rx sent.

## 2024-02-06 ENCOUNTER — PROCEDURE VISIT (OUTPATIENT)
Dept: OBSTETRICS AND GYNECOLOGY | Age: 34
End: 2024-02-06
Payer: COMMERCIAL

## 2024-02-06 VITALS
DIASTOLIC BLOOD PRESSURE: 74 MMHG | BODY MASS INDEX: 21.99 KG/M2 | HEIGHT: 65 IN | SYSTOLIC BLOOD PRESSURE: 104 MMHG | WEIGHT: 132 LBS

## 2024-02-06 DIAGNOSIS — Z31.89 ENCOUNTER FOR ARTIFICIAL INSEMINATION: Primary | ICD-10-CM

## 2024-02-06 NOTE — PROGRESS NOTES
Lisa Manrique is a 33 y.o. female here today for intrauterine insemination.  She had a mature follicle on US yesterday, triggered last night.  She is Femara on ovulation induction.  She has had her infertility workup through Dr. Cheng.  This is insemination #4.    The patient provided a fresh labeled semen sample for use for intrauterine insemination.  The semen sample was processed and washed using our standard laboratory procedure.  The specimen was allowed to liquefy for approximately 30 minutes, and was then labeled and transferred to a sterile centrifuge tube.  The volume was measured at 5.5 mL.  An equal volume of multipurpose handling medium was added to the centrifuge tube, and the sample mixed.  The sample was spun for 10 minutes, good pellet formation was noted, and the supernatant then discarded.  Another 2 mL of multipurpose handling medium was added to the centrifuge tube, and the specimen mixed.  The sample was spun for another 10 minutes, good pellet formation was noted, and the supernatant was then discarded.  0.3 mL multipurpose handling medium was then added to resuspend the pellett to be used for insemination.    The patient was placed in the lithotomy position on the exam table.  The cervix was visualized with a speculum.  The cervix was probed and found to be patent.  I have reviewed the slide prepared from the semen washings.  There are numerous mobile sperm noted in a fairly high concentration.  The prepared semen sample, of approximately 0.5cc, was drawn up in an insemination catheter and the catheter placed transcervically to the uterine fundus.  The entire sample was placed at the uterine fundus without difficulty.  The catheter and speculum were removed.  The patient was allowed to stay in a hips elevated position for 20 minutes prior to leaving the office.  She will notify the office in a couple of weeks if she has a period or a positive pregnancy test.

## 2024-06-27 ENCOUNTER — LAB (OUTPATIENT)
Dept: LAB | Facility: HOSPITAL | Age: 34
End: 2024-06-27
Payer: COMMERCIAL

## 2024-06-27 ENCOUNTER — OFFICE VISIT (OUTPATIENT)
Dept: FAMILY MEDICINE CLINIC | Facility: CLINIC | Age: 34
End: 2024-06-27
Payer: COMMERCIAL

## 2024-06-27 VITALS
HEART RATE: 78 BPM | RESPIRATION RATE: 18 BRPM | DIASTOLIC BLOOD PRESSURE: 70 MMHG | SYSTOLIC BLOOD PRESSURE: 110 MMHG | TEMPERATURE: 98.1 F | OXYGEN SATURATION: 98 % | BODY MASS INDEX: 21.33 KG/M2 | HEIGHT: 65 IN | WEIGHT: 128 LBS

## 2024-06-27 DIAGNOSIS — Z31.9 INFERTILITY MANAGEMENT: ICD-10-CM

## 2024-06-27 DIAGNOSIS — Z00.00 ANNUAL PHYSICAL EXAM: Primary | ICD-10-CM

## 2024-06-27 LAB — CORTIS SERPL-MCNC: 11.8 MCG/DL

## 2024-06-27 PROCEDURE — 82533 TOTAL CORTISOL: CPT

## 2024-06-27 PROCEDURE — 36415 COLL VENOUS BLD VENIPUNCTURE: CPT

## 2024-06-27 PROCEDURE — 99395 PREV VISIT EST AGE 18-39: CPT | Performed by: FAMILY MEDICINE

## 2024-06-27 NOTE — PROGRESS NOTES
"Chief Complaint  Annual Exam (Pt states doing ok overall )    Subjective        Lisa Manrique presents to [facility]  History of Present Illness  Patient is here for annual exam without complaint, has been trying to eat gluten-free and maximize her health in the setting of infertility.  ROS otherwise negative.    Past Medical History:   Diagnosis Date    Menstrual problem      Past Surgical History:   Procedure Laterality Date     SECTION  2019     Current Outpatient Medications   Medication Instructions    ferrous sulfate 325 mg, Oral, Daily With Breakfast    multivitamin with minerals tablet tablet 1 tablet, Oral, Daily    Progesterone (PROMETRIUM) 200 mg, Oral, Daily     Allergies   Allergen Reactions    Penicillins Unknown - Low Severity     Family History   Problem Relation Age of Onset    Obesity Father     Hypertension Father     Kidney disease Father     Diabetes Paternal Grandmother     Miscarriages / Stillbirths Maternal Grandmother      Social History     Tobacco Use    Smoking status: Never    Smokeless tobacco: Never   Vaping Use    Vaping status: Never Used   Substance Use Topics    Alcohol use: Never    Drug use: Never       Objective   Vital Signs:  /70   Pulse 78   Temp 98.1 °F (36.7 °C)   Resp 18   Ht 165.1 cm (65\")   Wt 58.1 kg (128 lb)   SpO2 98%   BMI 21.30 kg/m²   Estimated body mass index is 21.3 kg/m² as calculated from the following:    Height as of this encounter: 165.1 cm (65\").    Weight as of this encounter: 58.1 kg (128 lb).       BMI is within normal parameters. No other follow-up for BMI required.      Physical Exam  Constitutional:       General: She is not in acute distress.     Appearance: Normal appearance. She is not ill-appearing or diaphoretic.   HENT:      Head: Normocephalic and atraumatic.      Right Ear: Tympanic membrane and external ear normal.      Left Ear: Tympanic membrane and external ear normal.      Nose: Nose " normal. No congestion or rhinorrhea.      Mouth/Throat:      Mouth: Mucous membranes are moist.      Pharynx: Oropharynx is clear. No oropharyngeal exudate or posterior oropharyngeal erythema.   Eyes:      General: No scleral icterus.        Right eye: No discharge.         Left eye: No discharge.      Extraocular Movements: Extraocular movements intact.      Conjunctiva/sclera: Conjunctivae normal.      Pupils: Pupils are equal, round, and reactive to light.   Neck:      Thyroid: No thyromegaly.      Vascular: No JVD.   Cardiovascular:      Rate and Rhythm: Normal rate and regular rhythm.      Pulses: Normal pulses.      Heart sounds: Normal heart sounds. No murmur heard.     No friction rub. No gallop.   Pulmonary:      Effort: Pulmonary effort is normal.      Breath sounds: Normal breath sounds.   Abdominal:      General: Bowel sounds are normal. There is no distension.      Palpations: Abdomen is soft. There is no mass.      Tenderness: There is no abdominal tenderness. There is no guarding or rebound.   Musculoskeletal:         General: No deformity or signs of injury.      Cervical back: Neck supple.      Right lower leg: No edema.      Left lower leg: No edema.   Lymphadenopathy:      Cervical: No cervical adenopathy.   Skin:     General: Skin is warm and dry.      Capillary Refill: Capillary refill takes less than 2 seconds.      Coloration: Skin is not jaundiced or pale.   Neurological:      Mental Status: She is alert and oriented to person, place, and time. Mental status is at baseline.   Psychiatric:         Mood and Affect: Mood normal.         Behavior: Behavior normal.         Thought Content: Thought content normal.         Judgment: Judgment normal.        Result Review :                     Assessment and Plan     Diagnoses and all orders for this visit:    1. Annual physical exam (Primary)    2. Infertility management  -     Cortisol - AM; Future    Preventative: Encourage exercise, consider qi  chetna and yoga  Patient interested in cortisol levels ordered today         Follow Up     Return in about 1 year (around 6/27/2025) for Recheck.  Patient was given instructions and counseling regarding her condition or for health maintenance advice. Please see specific information pulled into the AVS if appropriate.

## 2024-09-06 ENCOUNTER — OFFICE VISIT (OUTPATIENT)
Dept: FAMILY MEDICINE CLINIC | Facility: CLINIC | Age: 34
End: 2024-09-06
Payer: COMMERCIAL

## 2024-09-06 VITALS
BODY MASS INDEX: 22.82 KG/M2 | HEIGHT: 65 IN | WEIGHT: 137 LBS | SYSTOLIC BLOOD PRESSURE: 90 MMHG | HEART RATE: 77 BPM | TEMPERATURE: 98.2 F | OXYGEN SATURATION: 100 % | DIASTOLIC BLOOD PRESSURE: 60 MMHG

## 2024-09-06 DIAGNOSIS — M54.50 ACUTE RIGHT-SIDED LOW BACK PAIN WITHOUT SCIATICA: Primary | ICD-10-CM

## 2024-09-06 PROCEDURE — 99213 OFFICE O/P EST LOW 20 MIN: CPT | Performed by: NURSE PRACTITIONER

## 2024-09-06 RX ORDER — METHYLPREDNISOLONE 4 MG
TABLET, DOSE PACK ORAL
Qty: 21 TABLET | Refills: 0 | Status: SHIPPED | OUTPATIENT
Start: 2024-09-06

## 2024-09-06 NOTE — PATIENT INSTRUCTIONS
Medrol dose pack as directed  Diclofenac gel 4 times/day as neededLow Back Sprain or Strain Rehab  Ask your health care provider which exercises are safe for you. Do exercises exactly as told by your health care provider and adjust them as directed. It is normal to feel mild stretching, pulling, tightness, or discomfort as you do these exercises. Stop right away if you feel sudden pain or your pain gets worse. Do not begin these exercises until told by your health care provider.  Stretching and range-of-motion exercises  These exercises warm up your muscles and joints and improve the movement and flexibility of your back. These exercises also help to relieve pain, numbness, and tingling.  Lumbar rotation    Lie on your back on a firm bed or the floor with your knees bent.  Straighten your arms out to your sides so each arm forms a 90-degree angle (right angle) with a side of your body.  Slowly move (rotate) both of your knees to one side of your body until you feel a stretch in your lower back (lumbar). Try not to let your shoulders lift off the floor.  Hold this position for 30 seconds.  Tense your abdominal muscles and slowly move your knees back to the starting position.  Repeat this exercise on the other side of your body.  Repeat 3 times. Complete this exercise twice a day.  Single knee to chest    Lie on your back on a firm bed or the floor with both legs straight.  Bend one of your knees. Use your hands to move your knee up toward your chest until you feel a gentle stretch in your lower back and buttock.  Hold your leg in this position by holding on to the front of your knee.  Keep your other leg as straight as possible.  Hold this position for 30 seconds.  Slowly return to the starting position.  Repeat with your other leg.  Repeat 3 times. Complete this exercise twice a day.  Prone extension on elbows    Lie on your abdomen on a firm bed or the floor (prone position).  Prop yourself up on your elbows.  Use  your arms to help lift your chest up until you feel a gentle stretch in your abdomen and your lower back.  This will place some of your body weight on your elbows. If this is uncomfortable, try stacking pillows under your chest.  Your hips should stay down, against the surface that you are lying on. Keep your hip and back muscles relaxed.  Hold this position for 30 seconds.  Slowly relax your upper body and return to the starting position.  Repeat 3 times. Complete this exercise twice a day.  Strengthening exercises  These exercises build strength and endurance in your back. Endurance is the ability to use your muscles for a long time, even after they get tired.  Pelvic tilt  This exercise strengthens the muscles that lie deep in the abdomen.  Lie on your back on a firm bed or the floor with your legs extended.  Bend your knees so they are pointing toward the ceiling and your feet are flat on the floor.  Tighten your lower abdominal muscles to press your lower back against the floor. This motion will tilt your pelvis so your tailbone points up toward the ceiling instead of pointing to your feet or the floor.  To help with this exercise, you may place a small towel under your lower back and try to push your back into the towel.  Hold this position for 30 seconds.  Let your muscles relax completely before you repeat this exercise.  Repeat 3 times. Complete this exercise twice a day..  Alternating arm and leg raises    Get on your hands and knees on a firm surface. If you are on a hard floor, you may want to use padding, such as an exercise mat, to cushion your knees.  Line up your arms and legs. Your hands should be directly below your shoulders, and your knees should be directly below your hips.  Lift your left leg behind you. At the same time, raise your right arm and straighten it in front of you.  Do not lift your leg higher than your hip.  Do not lift your arm higher than your shoulder.  Keep your abdominal and  back muscles tight.  Keep your hips facing the ground.  Do not arch your back.  Keep your balance carefully, and do not hold your breath.  Hold this position for 30 seconds.  Slowly return to the starting position.  Repeat with your right leg and your left arm.  Repeat 3 times. Complete this exercise twice a day.  Abdominal set with straight leg raise    Lie on your back on a firm bed or the floor.  Bend one of your knees and keep your other leg straight.  Tense your abdominal muscles and lift your straight leg up, 4-6 inches (10-15 cm) off the ground.  Keep your abdominal muscles tight and hold this position for 30 seconds.  Do not hold your breath.  Do not arch your back. Keep it flat against the ground.  Keep your abdominal muscles tense as you slowly lower your leg back to the starting position.  Repeat with your other leg.  Repeat 3 times. Complete this exercise twice a day.  Single leg lower with bent knees  Lie on your back on a firm bed or the floor.  Tense your abdominal muscles and lift your feet off the floor, one foot at a time, so your knees and hips are bent in 90-degree angles (right angles).  Your knees should be over your hips and your lower legs should be parallel to the floor.  Keeping your abdominal muscles tense and your knee bent, slowly lower one of your legs so your toe touches the ground.  Lift your leg back up to return to the starting position.  Do not hold your breath.  Do not let your back arch. Keep your back flat against the ground.  Repeat with your other leg.  Repeat 3 times. Complete this exercise twice a day.  Posture and body mechanics  Good posture and healthy body mechanics can help to relieve stress in your body's tissues and joints. Body mechanics refers to the movements and positions of your body while you do your daily activities. Posture is part of body mechanics. Good posture means:  Your spine is in its natural S-curve position (neutral).  Your shoulders are pulled back  slightly.  Your head is not tipped forward (neutral).  Follow these guidelines to improve your posture and body mechanics in your everyday activities.  Standing    When standing, keep your spine neutral and your feet about hip-width apart. Keep a slight bend in your knees. Your ears, shoulders, and hips should line up.  When you do a task in which you  one place for a long time, place one foot up on a stable object that is 2-4 inches (5-10 cm) high, such as a footstool. This helps keep your spine neutral.  Sitting    When sitting, keep your spine neutral and keep your feet flat on the floor. Use a footrest, if necessary, and keep your thighs parallel to the floor. Avoid rounding your shoulders, and avoid tilting your head forward.  When working at a desk or a computer, keep your desk at a height where your hands are slightly lower than your elbows. Slide your chair under your desk so you are close enough to maintain good posture.  When working at a computer, place your monitor at a height where you are looking straight ahead and you do not have to tilt your head forward or downward to look at the screen.  Resting  When lying down and resting, avoid positions that are most painful for you.  If you have pain with activities such as sitting, bending, stooping, or squatting, lie in a position in which your body does not bend very much. For example, avoid curling up on your side with your arms and knees near your chest (fetal position).  If you have pain with activities such as standing for a long time or reaching with your arms, lie with your spine in a neutral position and bend your knees slightly. Try the following positions:  Lying on your side with a pillow between your knees.  Lying on your back with a pillow under your knees.  Lifting    When lifting objects, keep your feet at least shoulder-width apart and tighten your abdominal muscles.  Bend your knees and hips and keep your spine neutral. It is  important to lift using the strength of your legs, not your back. Do not lock your knees straight out.  Always ask for help to lift heavy or awkward objects.  This information is not intended to replace advice given to you by your health care provider. Make sure you discuss any questions you have with your health care provider.  Document Revised: 03/07/2022 Document Reviewed: 03/07/2022  [employer] Patient Education © 2024 [employer]

## 2024-09-06 NOTE — PROGRESS NOTES
AZRA Jose  [facility]   Family Medicine  [address]  [place] [address]  Phone: [phone]  Fax: [phone]         Chief Complaint:  Chief Complaint   Patient presents with    Back Pain     Patient presents to clinic with LBP right side radiating into the glute.  Has tried stretching, massage gun        History:  Lisa Manrique is a 33 y.o. female that is an established patient. She  is here for evaluation of the above complaint.    HPI   History of Present Illness  The patient presents for evaluation of back pain.    She experienced a back injury approximately 2 years ago while dressing, which led to chiropractic treatment. Since then, she has been using a lumbar pillow for support during driving. She reports intermittent lower right quadrant back pain, which is usually alleviated by rest, stretching, and massage. The pain, which she rates as 3 out of 10, is not constant but is noticeable when sitting. She finds standing comfortable but bending causes discomfort extending into her gluteal region. She also reports occasional pain in the front. This episode has been going on for 1 week. The massage gun helped at first. She is seeking medical advice to prevent further injury. She recalls an incident where lifting a crockpot from a shelf caused a tugging sensation in her abdomen. She found relief from using a massage gun, but this is no longer effective. She avoids sitting in poor postures that exacerbate the pain. She has taken ibuprofen once and Excedrin Migraine once, but these were for menstrual headaches, not her back pain. Her sleep is unaffected by the pain.      Results         ROS:  Review of Systems   Constitutional: Negative.    HENT: Negative.     Respiratory: Negative.     Musculoskeletal:  Positive for back pain.         reports that she has never smoked. She has never used smokeless tobacco. She reports that she does not drink alcohol and does not use  "drugs.    Current Outpatient Medications   Medication Instructions    Diclofenac Sodium (VOLTAREN) 4 g, Topical, 4 Times Daily PRN    ferrous sulfate 325 mg, Daily With Breakfast    methylPREDNISolone (MEDROL) 4 MG dose pack Take as directed on package instructions.    multivitamin with minerals tablet tablet 1 tablet, Oral, Daily    Progesterone (PROMETRIUM) 200 mg, Oral, Daily       OBJECTIVE:  BP 90/60 (BP Location: Right arm, Patient Position: Sitting, Cuff Size: Adult)   Pulse 77   Temp 98.2 °F (36.8 °C) (Temporal)   Ht 165.1 cm (65\")   Wt 62.1 kg (137 lb)   SpO2 100%   BMI 22.80 kg/m²    Physical Exam  Vitals and nursing note reviewed.   Constitutional:       Appearance: Normal appearance.   HENT:      Head: Normocephalic and atraumatic.      Nose: Nose normal.   Eyes:      Conjunctiva/sclera: Conjunctivae normal.   Musculoskeletal:      Lumbar back: Tenderness present. No swelling or bony tenderness. Decreased range of motion. Negative right straight leg raise test and negative left straight leg raise test.   Neurological:      General: No focal deficit present.      Mental Status: She is alert and oriented to person, place, and time.   Psychiatric:         Mood and Affect: Mood normal.         Behavior: Behavior normal.       Physical Exam      Procedures    Assessment/Plan:     Diagnoses and all orders for this visit:    1. Acute right-sided low back pain without sciatica (Primary)  -     methylPREDNISolone (MEDROL) 4 MG dose pack; Take as directed on package instructions.  Dispense: 21 tablet; Refill: 0  -     Diclofenac Sodium (VOLTAREN) 1 % gel gel; Apply 4 g topically to the appropriate area as directed 4 (Four) Times a Day As Needed (back pain).  Dispense: 100 g; Refill: 1      BMI is within normal parameters. No other follow-up for BMI required.     Assessment & Plan  1. Back pain.  The discomfort appears to be due to inflammation and muscle spasm, possibly involving the nerve. A skeletal issue " is unlikely. A lumbar x-ray will be ordered. A prescription for a steroid pack will be provided. Diclofenac gel will be prescribed for topical use to reduce inflammation. Lower back stretches have been recommended and instructions have been uploaded to her [employer]. She is advised to contact the clinic if there is no improvement in her condition.      An After Visit Summary was printed and given to the patient at discharge.  Return for Next scheduled follow up.       Patient Instructions   Medrol dose pack as directed  Diclofenac gel 4 times/day as neededLow Back Sprain or Strain Rehab  Ask your health care provider which exercises are safe for you. Do exercises exactly as told by your health care provider and adjust them as directed. It is normal to feel mild stretching, pulling, tightness, or discomfort as you do these exercises. Stop right away if you feel sudden pain or your pain gets worse. Do not begin these exercises until told by your health care provider.  Stretching and range-of-motion exercises  These exercises warm up your muscles and joints and improve the movement and flexibility of your back. These exercises also help to relieve pain, numbness, and tingling.  Lumbar rotation    Lie on your back on a firm bed or the floor with your knees bent.  Straighten your arms out to your sides so each arm forms a 90-degree angle (right angle) with a side of your body.  Slowly move (rotate) both of your knees to one side of your body until you feel a stretch in your lower back (lumbar). Try not to let your shoulders lift off the floor.  Hold this position for 30 seconds.  Tense your abdominal muscles and slowly move your knees back to the starting position.  Repeat this exercise on the other side of your body.  Repeat 3 times. Complete this exercise twice a day.  Single knee to chest    Lie on your back on a firm bed or the floor with both legs straight.  Bend one of your knees. Use your hands to move your knee up  toward your chest until you feel a gentle stretch in your lower back and buttock.  Hold your leg in this position by holding on to the front of your knee.  Keep your other leg as straight as possible.  Hold this position for 30 seconds.  Slowly return to the starting position.  Repeat with your other leg.  Repeat 3 times. Complete this exercise twice a day.  Prone extension on elbows    Lie on your abdomen on a firm bed or the floor (prone position).  Prop yourself up on your elbows.  Use your arms to help lift your chest up until you feel a gentle stretch in your abdomen and your lower back.  This will place some of your body weight on your elbows. If this is uncomfortable, try stacking pillows under your chest.  Your hips should stay down, against the surface that you are lying on. Keep your hip and back muscles relaxed.  Hold this position for 30 seconds.  Slowly relax your upper body and return to the starting position.  Repeat 3 times. Complete this exercise twice a day.  Strengthening exercises  These exercises build strength and endurance in your back. Endurance is the ability to use your muscles for a long time, even after they get tired.  Pelvic tilt  This exercise strengthens the muscles that lie deep in the abdomen.  Lie on your back on a firm bed or the floor with your legs extended.  Bend your knees so they are pointing toward the ceiling and your feet are flat on the floor.  Tighten your lower abdominal muscles to press your lower back against the floor. This motion will tilt your pelvis so your tailbone points up toward the ceiling instead of pointing to your feet or the floor.  To help with this exercise, you may place a small towel under your lower back and try to push your back into the towel.  Hold this position for 30 seconds.  Let your muscles relax completely before you repeat this exercise.  Repeat 3 times. Complete this exercise twice a day..  Alternating arm and leg raises    Get on your  hands and knees on a firm surface. If you are on a hard floor, you may want to use padding, such as an exercise mat, to cushion your knees.  Line up your arms and legs. Your hands should be directly below your shoulders, and your knees should be directly below your hips.  Lift your left leg behind you. At the same time, raise your right arm and straighten it in front of you.  Do not lift your leg higher than your hip.  Do not lift your arm higher than your shoulder.  Keep your abdominal and back muscles tight.  Keep your hips facing the ground.  Do not arch your back.  Keep your balance carefully, and do not hold your breath.  Hold this position for 30 seconds.  Slowly return to the starting position.  Repeat with your right leg and your left arm.  Repeat 3 times. Complete this exercise twice a day.  Abdominal set with straight leg raise    Lie on your back on a firm bed or the floor.  Bend one of your knees and keep your other leg straight.  Tense your abdominal muscles and lift your straight leg up, 4-6 inches (10-15 cm) off the ground.  Keep your abdominal muscles tight and hold this position for 30 seconds.  Do not hold your breath.  Do not arch your back. Keep it flat against the ground.  Keep your abdominal muscles tense as you slowly lower your leg back to the starting position.  Repeat with your other leg.  Repeat 3 times. Complete this exercise twice a day.  Single leg lower with bent knees  Lie on your back on a firm bed or the floor.  Tense your abdominal muscles and lift your feet off the floor, one foot at a time, so your knees and hips are bent in 90-degree angles (right angles).  Your knees should be over your hips and your lower legs should be parallel to the floor.  Keeping your abdominal muscles tense and your knee bent, slowly lower one of your legs so your toe touches the ground.  Lift your leg back up to return to the starting position.  Do not hold your breath.  Do not let your back arch. Keep  your back flat against the ground.  Repeat with your other leg.  Repeat 3 times. Complete this exercise twice a day.  Posture and body mechanics  Good posture and healthy body mechanics can help to relieve stress in your body's tissues and joints. Body mechanics refers to the movements and positions of your body while you do your daily activities. Posture is part of body mechanics. Good posture means:  Your spine is in its natural S-curve position (neutral).  Your shoulders are pulled back slightly.  Your head is not tipped forward (neutral).  Follow these guidelines to improve your posture and body mechanics in your everyday activities.  Standing    When standing, keep your spine neutral and your feet about hip-width apart. Keep a slight bend in your knees. Your ears, shoulders, and hips should line up.  When you do a task in which you  one place for a long time, place one foot up on a stable object that is 2-4 inches (5-10 cm) high, such as a footstool. This helps keep your spine neutral.  Sitting    When sitting, keep your spine neutral and keep your feet flat on the floor. Use a footrest, if necessary, and keep your thighs parallel to the floor. Avoid rounding your shoulders, and avoid tilting your head forward.  When working at a desk or a computer, keep your desk at a height where your hands are slightly lower than your elbows. Slide your chair under your desk so you are close enough to maintain good posture.  When working at a computer, place your monitor at a height where you are looking straight ahead and you do not have to tilt your head forward or downward to look at the screen.  Resting  When lying down and resting, avoid positions that are most painful for you.  If you have pain with activities such as sitting, bending, stooping, or squatting, lie in a position in which your body does not bend very much. For example, avoid curling up on your side with your arms and knees near your chest (fetal  position).  If you have pain with activities such as standing for a long time or reaching with your arms, lie with your spine in a neutral position and bend your knees slightly. Try the following positions:  Lying on your side with a pillow between your knees.  Lying on your back with a pillow under your knees.  Lifting    When lifting objects, keep your feet at least shoulder-width apart and tighten your abdominal muscles.  Bend your knees and hips and keep your spine neutral. It is important to lift using the strength of your legs, not your back. Do not lock your knees straight out.  Always ask for help to lift heavy or awkward objects.  This information is not intended to replace advice given to you by your health care provider. Make sure you discuss any questions you have with your health care provider.  Document Revised: 03/07/2022 Document Reviewed: 03/07/2022  Elsevier Patient Education © 2024 [employer]       Discussion:     Discussed options such as PT, muscle relaxer, OMT, chiropractic    I spent 28 minutes caring for Lisa on this date of service. This time includes time spent by me in the following activities: preparing for the visit, reviewing tests, performing a medically appropriate examination and/or evaluation, counseling and educating the patient/family/caregiver, documenting information in the medical record, independently interpreting results and communicating that information with the patient/family/caregiver, care coordination, ordering medications, obtaining a separately obtained history, and reviewing a separately obtained history   Patient or patient representative verbalized consent for the use of Ambient Listening during the visit with  AZRA Jose for chart documentation. 9/6/2024  15:41 CDT    Cierra OQUENDO 9/6/2024   Electronically signed.

## 2024-09-13 ENCOUNTER — TELEPHONE (OUTPATIENT)
Dept: FAMILY MEDICINE CLINIC | Facility: CLINIC | Age: 34
End: 2024-09-13
Payer: COMMERCIAL

## 2024-09-16 ENCOUNTER — TELEPHONE (OUTPATIENT)
Dept: FAMILY MEDICINE CLINIC | Facility: CLINIC | Age: 34
End: 2024-09-16
Payer: COMMERCIAL

## 2024-11-01 ENCOUNTER — TELEPHONE (OUTPATIENT)
Dept: OBSTETRICS AND GYNECOLOGY | Age: 34
End: 2024-11-01
Payer: COMMERCIAL

## 2024-11-01 NOTE — TELEPHONE ENCOUNTER
Spoke with pt by phone and informed her that the last imaging I have is from 02/2024 and the only cyst noted was follicular, which is what we wanted at the time r/t fertility treatments. Pt informed that endometriosis is confirmed by diagnostic laparoscopy and if she is interested in discussing that surgery she would need to make an appt with Dr Cheng. Pt voiced understanding.

## 2024-11-01 NOTE — TELEPHONE ENCOUNTER
Caller: Lisa Manrique    Relationship: Self    Best call back number:[phone]     What is the best time to reach you: ASAP    Who are you requesting to speak with (clinical staff, provider,  specific staff member):     DR DIAZ OR HIS NURSE    What was the call regarding:     PT WOULD LIKE TO KNOW IF W/ ALL THE IMAGAING/SCANS DURING IVF WOULD HAVE SHOWN ENDO OR CYSTS    RE: PT STILL HAVING VERY HEAVY PERIODS    PLEASE CALL PT TO DISCUSS

## 2024-12-02 ENCOUNTER — OFFICE VISIT (OUTPATIENT)
Dept: OBSTETRICS AND GYNECOLOGY | Age: 34
End: 2024-12-02
Payer: COMMERCIAL

## 2024-12-02 VITALS
WEIGHT: 136 LBS | HEIGHT: 65 IN | BODY MASS INDEX: 22.66 KG/M2 | SYSTOLIC BLOOD PRESSURE: 98 MMHG | DIASTOLIC BLOOD PRESSURE: 60 MMHG

## 2024-12-02 DIAGNOSIS — Z01.419 ENCOUNTER FOR GYNECOLOGICAL EXAMINATION: Primary | ICD-10-CM

## 2024-12-02 DIAGNOSIS — Z12.31 ENCOUNTER FOR MAMMOGRAM TO ESTABLISH BASELINE MAMMOGRAM: ICD-10-CM

## 2024-12-02 DIAGNOSIS — N92.0 MENORRHAGIA WITH REGULAR CYCLE: ICD-10-CM

## 2024-12-02 PROCEDURE — G0123 SCREEN CERV/VAG THIN LAYER: HCPCS | Performed by: NURSE PRACTITIONER

## 2024-12-02 PROCEDURE — 99395 PREV VISIT EST AGE 18-39: CPT | Performed by: NURSE PRACTITIONER

## 2024-12-02 PROCEDURE — 87624 HPV HI-RISK TYP POOLED RSLT: CPT | Performed by: NURSE PRACTITIONER

## 2024-12-02 PROCEDURE — 99459 PELVIC EXAMINATION: CPT | Performed by: NURSE PRACTITIONER

## 2024-12-02 NOTE — PROGRESS NOTES
Chief Complaint  Annual Exam (Pt is here for an annual exam/Last pap per patient- Spring 2023 Dr Arnett /Pt has no complaints today )  History of Present Illness  The patient is a 34-year-old female who presents for her annual exam.    She has been under the care of Dr. Cheng since 09/2023, following a history of intrauterine insemination (IUI).   She is planning to move to [place] next summer.    Approximately 12 to 13 years ago, she consulted an OB/GYN in [place] due to severe menstrual pain.   She was diagnosed with endometriosis and prescribed birth control pills.   After discontinuing the pills, she experienced difficulty conceiving, leading to a referral to a fertility specialist.   Despite normal test results, she underwent in vitro fertilization (IVF) due to her endometriosis diagnosis.   She has one son from four embryos and experienced a natural miscarriage at 5 weeks.   Since moving here on 01/01/2022, she has been under the care of Dr. Hanson, who questioned her endometriosis diagnosis.    Her menstrual cycle returned 10 to 11 months after giving birth, initially light but later becoming heavy with large clots.   She experiences spotting for a week or more after her period.   She has undergone various tests, all of which were normal.   She tried Clomid for a few months but discontinued it.   She underwent four unsuccessful rounds of IUI and was referred to a reproductive endocrinologist.     She uses ovulation kits and believes she is ovulating.     She had a video consultation with a reproductive endocrinologist who recommended   She was advised to undergo a 3D water ultrasound and an endometrial biopsy.   If these tests are normal, she may need to check her TSH, Hgb A1C, and prolactin levels.   She was also advised to have a semen analysis done on her  again.     She reports no gastrointestinal issues or urinary incontinence.   Her periods are heavy and occasionally crampy, but the  "cramps resolve on their own or with ibuprofen.   She has never had a mammogram and reports no nipple discharge.   She used to experience headaches during her periods, lasting 2 to 3 days, but this is no longer the case.   She reports no vaginal discharge, itching, or odor.   She does not experience bleeding between periods once they stop.       Subjective          Lisa Manrique presents to [facility]  History of Present Illness    Review of Systems   Constitutional:  Negative for activity change, appetite change, fatigue and fever.   HENT:  Negative for congestion, sore throat and trouble swallowing.    Eyes:  Negative for pain, discharge and visual disturbance.   Respiratory:  Negative for apnea, shortness of breath and wheezing.    Cardiovascular:  Negative for chest pain, palpitations and leg swelling.   Gastrointestinal:  Negative for abdominal pain, constipation and diarrhea.   Genitourinary:  Negative for frequency, pelvic pain, urgency and vaginal discharge.        Heavy clotting.    Musculoskeletal:  Negative for back pain and gait problem.   Skin:  Negative for color change and rash.   Neurological:  Negative for dizziness, weakness and numbness.   Psychiatric/Behavioral:  Negative for confusion and sleep disturbance.         Objective   Vital Signs:   BP 98/60   Ht 165.1 cm (65\")   Wt 61.7 kg (136 lb)   BMI 22.63 kg/m²     Physical Exam  Vitals and nursing note reviewed. Exam conducted with a chaperone present.   Constitutional:       General: She is not in acute distress.     Appearance: She is well-developed. She is not diaphoretic.   HENT:      Head: Normocephalic.      Right Ear: External ear normal.      Left Ear: External ear normal.      Nose: Nose normal.   Eyes:      General: No scleral icterus.        Right eye: No discharge.         Left eye: No discharge.      Conjunctiva/sclera: Conjunctivae normal.      Pupils: Pupils are equal, round, and reactive to light.   Neck:    "   Thyroid: No thyromegaly.      Vascular: No carotid bruit.      Trachea: No tracheal deviation.   Cardiovascular:      Rate and Rhythm: Normal rate and regular rhythm.      Heart sounds: Normal heart sounds. No murmur heard.  Pulmonary:      Effort: Pulmonary effort is normal. No respiratory distress.      Breath sounds: Normal breath sounds. No wheezing.   Chest:   Breasts:     Breasts are symmetrical.      Right: Normal. No swelling, bleeding, inverted nipple, mass, nipple discharge, skin change or tenderness.      Left: Normal. No swelling, bleeding, inverted nipple, mass, nipple discharge, skin change or tenderness.   Abdominal:      General: There is no distension.      Palpations: Abdomen is soft. There is no mass.      Tenderness: There is no abdominal tenderness. There is no right CVA tenderness, left CVA tenderness or guarding.      Hernia: No hernia is present. There is no hernia in the left inguinal area or right inguinal area.   Genitourinary:     General: Normal vulva.      Exam position: Lithotomy position.      Labia:         Right: No rash, tenderness, lesion or injury.         Left: No rash, tenderness, lesion or injury.       Vagina: Normal. No signs of injury and foreign body. No vaginal discharge, erythema, tenderness or bleeding.      Cervix: Normal.      Uterus: Normal. Not enlarged, not fixed and not tender.       Adnexa: Right adnexa normal and left adnexa normal.        Right: No mass, tenderness or fullness.          Left: No mass, tenderness or fullness.        Rectum: Normal. No mass.      Comments:   BSU normal  Urethral meatus  Normal  Perineum  Normal  Musculoskeletal:         General: No tenderness. Normal range of motion.      Cervical back: Normal range of motion and neck supple.   Lymphadenopathy:      Head:      Right side of head: No submental, submandibular, tonsillar, preauricular, posterior auricular or occipital adenopathy.      Left side of head: No submental,  submandibular, tonsillar, preauricular, posterior auricular or occipital adenopathy.      Cervical: No cervical adenopathy.      Right cervical: No superficial, deep or posterior cervical adenopathy.     Left cervical: No superficial, deep or posterior cervical adenopathy.      Upper Body:      Right upper body: No supraclavicular, axillary or pectoral adenopathy.      Left upper body: No supraclavicular, axillary or pectoral adenopathy.      Lower Body: No right inguinal adenopathy. No left inguinal adenopathy.   Skin:     General: Skin is warm and dry.      Findings: No bruising, erythema or rash.   Neurological:      Mental Status: She is alert and oriented to person, place, and time.      Coordination: Coordination normal.   Psychiatric:         Mood and Affect: Mood normal.         Behavior: Behavior normal.         Thought Content: Thought content normal.         Judgment: Judgment normal.       Physical Exam    Result Review :   The following data was reviewed by: AZRA Martin on 12/02/2024:    Data reviewed : Radiologic studies transvaginal US.         Current Outpatient Medications on File Prior to Visit   Medication Sig    Multiple Vitamins-Minerals (ZINC PO) Take  by mouth.    multivitamin with minerals tablet tablet Take 1 tablet by mouth Daily.    Diclofenac Sodium (VOLTAREN) 1 % gel gel Apply 4 g topically to the appropriate area as directed 4 (Four) Times a Day As Needed (back pain). (Patient not taking: Reported on 12/2/2024)    ferrous sulfate 325 (65 FE) MG tablet Take 1 tablet by mouth Daily With Breakfast. (Patient not taking: Reported on 12/2/2024)    methylPREDNISolone (MEDROL) 4 MG dose pack Take as directed on package instructions. (Patient not taking: Reported on 12/2/2024)    Progesterone (Prometrium) 200 MG capsule Take 1 capsule by mouth Daily. (Patient not taking: Reported on 12/2/2024)     No current facility-administered medications on file prior to visit.           Assessment and Plan      Well woman GYN exam.   Pap smear done per ASCCP guidelines.   Pelvic exam with chaperone present.     Will have lab work at PCP.     Discussed STD prevention and testing.   Pt declines Chlamydia/Gonorrhea/Trichomonas, RPR, Hep panel and HIV testing.     M[facility] will be [facility] at [facility].         Assessment & Plan    1. Heavy menstrual bleeding.  She experiences heavy menstrual bleeding with clots too large for the drain, lasting for 24 to 48 hours, followed by spotting for a week or more. Tranexamic acid was suggested to reduce the bleeding. However, she prefers to wait until after the ultrasound and biopsy before considering this medication.    2. Unexplained infertility.  She has a history of infertility and has undergone four unsuccessful rounds of IUI. A referral to a fertility endocrinologist was suggested pt opted to consult a provider in [place] r/t upcoming move. The endocrinologist recommended a 3D water ultrasound and an endometrial biopsy to investigate the cause of her abnormal uterine bleeding and infertility. It was also recommended to repeat the semen analysis.  Pt desires orders or a referral to specialist in the area that can do this testing. Advised pt I will forward information to Dr. Cheng.       Diagnoses and all orders for this visit:    1. Encounter for gynecological examination (Primary)  -     Liquid-based Pap Smear, Screening  -     HPV DNA Probe, Direct - ThinPrep Vial, Cervix    2. Encounter for mammogram to establish baseline mammogram  -     Mammo Screening Digital Tomosynthesis Bilateral With CAD; Future    3. Menorrhagia with regular cycle          BMI is within normal parameters. No other follow-up for BMI required.       Follow Up   Return for Annual physical.    Patient was given instructions and counseling regarding her condition or for health maintenance advice. Please see specific information pulled into the AVS if appropriate.     Patient  or patient representative verbalized consent for the use of Ambient Listening during the visit with  AZRA Martin for chart documentation. 12/2/2024  12:46 CST

## 2024-12-02 NOTE — PATIENT INSTRUCTIONS

## 2024-12-03 LAB
GEN CATEG CVX/VAG CYTO-IMP: NORMAL
HPV I/H RISK 4 DNA CVX QL PROBE+SIG AMP: NOT DETECTED
LAB AP CASE REPORT: NORMAL
LAB AP GYN ADDITIONAL INFORMATION: NORMAL
Lab: NORMAL
PATH INTERP SPEC-IMP: NORMAL
STAT OF ADQ CVX/VAG CYTO-IMP: NORMAL

## 2024-12-06 ENCOUNTER — TELEPHONE (OUTPATIENT)
Dept: OBSTETRICS AND GYNECOLOGY | Age: 34
End: 2024-12-06
Payer: COMMERCIAL

## 2024-12-06 NOTE — TELEPHONE ENCOUNTER
Called patient to let her know that she will need to get records released from the clinic she was seen by in [place] for Dr Cheng to review.  Per patient, she has had no testing/imaging done with the clinic in [place], this was a video visit only.  I advised patient that I would let you know and in the mean time to go ahead and have that office send us the documentation on the video visit done with patient.

## 2024-12-12 NOTE — TELEPHONE ENCOUNTER
Spoke with patient by phone and she will call me with the start of her next period so we can schedule her for endo bx during the follicular phase of her cycle. Pt informed this can be done with any provider in the clinic, so we will find an appt regardless of schedule being booked. Pt also informed the saline infusion sonography will have to be done with a fertility specialist either in [place] or when she moves to [place]. Pt voiced understanding.

## 2024-12-12 NOTE — TELEPHONE ENCOUNTER
Patient called today for an update. Patient informed Dr. Cheng wants appointment with [facility] in follicular phase. Patients LMP was 11/25. Where her follicular phase lands for upcoming cycle, schedule is booked. Can you follow up with patient to schedule her? Thank you

## 2024-12-16 LAB
NCCN CRITERIA FLAG: NORMAL
TYRER CUZICK SCORE: 13.3

## 2024-12-17 ENCOUNTER — HOSPITAL ENCOUNTER (OUTPATIENT)
Dept: MAMMOGRAPHY | Facility: HOSPITAL | Age: 34
Discharge: HOME | End: 2024-12-17
Admitting: NURSE PRACTITIONER
Payer: COMMERCIAL

## 2024-12-17 DIAGNOSIS — Z12.31 ENCOUNTER FOR MAMMOGRAM TO ESTABLISH BASELINE MAMMOGRAM: ICD-10-CM

## 2024-12-17 PROCEDURE — 77067 SCR MAMMO BI INCL CAD: CPT

## 2024-12-17 PROCEDURE — 77063 BREAST TOMOSYNTHESIS BI: CPT

## 2024-12-23 ENCOUNTER — TELEPHONE (OUTPATIENT)
Dept: OBSTETRICS AND GYNECOLOGY | Age: 34
End: 2024-12-23
Payer: COMMERCIAL

## 2024-12-23 NOTE — TELEPHONE ENCOUNTER
Pt has called to inform that she has started her period this afternoon and will need scheduled for an endometrial biopsy.  I am unsure if this will need an ultrasound with the appointment or not, notified patient you will give her a call back to schedule

## 2024-12-26 NOTE — TELEPHONE ENCOUNTER
Reviewed with Dr Cheng and pt to come tomorrow 12/27 at 845 for endo bx, no US needed. Pt informed by phone and voiced understanding.

## 2024-12-27 ENCOUNTER — PROCEDURE VISIT (OUTPATIENT)
Age: 34
End: 2024-12-27
Payer: COMMERCIAL

## 2024-12-27 VITALS
DIASTOLIC BLOOD PRESSURE: 68 MMHG | SYSTOLIC BLOOD PRESSURE: 108 MMHG | WEIGHT: 138 LBS | HEIGHT: 65 IN | BODY MASS INDEX: 22.99 KG/M2

## 2024-12-27 DIAGNOSIS — N93.9 ABNORMAL UTERINE BLEEDING (AUB): Primary | ICD-10-CM

## 2024-12-27 DIAGNOSIS — Z31.9 INFERTILITY MANAGEMENT: ICD-10-CM

## 2024-12-27 LAB
B-HCG UR QL: NEGATIVE
EXPIRATION DATE: NORMAL
INTERNAL NEGATIVE CONTROL: NEGATIVE
INTERNAL POSITIVE CONTROL: POSITIVE
Lab: NORMAL

## 2024-12-27 PROCEDURE — 88305 TISSUE EXAM BY PATHOLOGIST: CPT | Performed by: OBSTETRICS & GYNECOLOGY

## 2024-12-27 NOTE — PROGRESS NOTES
"Lisa Manrique is a 34 y.o. female here today for evaluation of abnormal uterine bleeding.  She has had prolonged periods and heavy bleeding with the passage of clots.  She is considering going through infertility treatments and would like to have full evaluation of her abnormal menstrual bleeding.  Prior ultrasound in February showed a normal-appearing uterus.    Visit Vitals  /68 (BP Location: Left arm, Patient Position: Sitting)   Ht 165.1 cm (65\")   Wt 62.6 kg (138 lb)   LMP 12/23/2024 (Exact Date)   BMI 22.96 kg/m²     Pleasant female no acute distress  Mood and affect normal  Breathing unlabored    An endometrial biopsy was performed in the office today.  The cervix was visualized with a speculum and prepped with Betadine.  The anterior lip was grasped with a tenaculum and a standard endometrial sampling Pipelle was passed into the uterine cavity.  The patient experienced mild cramping and the uterus sounded to 6.5 cm.  Aspiration with the Pipelle route turned mostly dark blood, but a scant amount of tissue was obtained with 2 passes.  The tenaculum was removed and the site was hemostatic.  She tolerated the procedure well.    Assessment: Abnormal uterine bleeding    We will notify her when the biopsy results return.  I have ordered a TSH, prolactin, and hemoglobin A1c today.  She will return on cycle day 3 for FSH, estradiol, and AMH.  Call with questions or concerns.      "

## 2024-12-28 LAB
HBA1C MFR BLD: 5.3 % (ref 4.8–5.6)
PROLACTIN SERPL-MCNC: 18 NG/ML (ref 4.8–33.4)
TSH SERPL DL<=0.005 MIU/L-ACNC: 1.91 UIU/ML (ref 0.45–4.5)

## 2025-01-02 LAB
CYTO UR: NORMAL
LAB AP CASE REPORT: NORMAL
LAB AP CLINICAL INFORMATION: NORMAL
Lab: NORMAL
PATH REPORT.FINAL DX SPEC: NORMAL
PATH REPORT.GROSS SPEC: NORMAL

## 2025-03-07 ENCOUNTER — OFFICE VISIT (OUTPATIENT)
Dept: FAMILY MEDICINE CLINIC | Facility: CLINIC | Age: 35
End: 2025-03-07
Payer: COMMERCIAL

## 2025-03-07 ENCOUNTER — HOSPITAL ENCOUNTER (OUTPATIENT)
Dept: GENERAL RADIOLOGY | Facility: HOSPITAL | Age: 35
Discharge: HOME | End: 2025-03-07
Admitting: NURSE PRACTITIONER
Payer: COMMERCIAL

## 2025-03-07 VITALS
OXYGEN SATURATION: 100 % | BODY MASS INDEX: 23.66 KG/M2 | DIASTOLIC BLOOD PRESSURE: 66 MMHG | WEIGHT: 142 LBS | SYSTOLIC BLOOD PRESSURE: 110 MMHG | HEIGHT: 65 IN | TEMPERATURE: 96.8 F | HEART RATE: 83 BPM

## 2025-03-07 DIAGNOSIS — M54.50 ACUTE RIGHT-SIDED LOW BACK PAIN WITHOUT SCIATICA: ICD-10-CM

## 2025-03-07 DIAGNOSIS — M54.50 ACUTE RIGHT-SIDED LOW BACK PAIN WITHOUT SCIATICA: Primary | ICD-10-CM

## 2025-03-07 PROCEDURE — 72114 X-RAY EXAM L-S SPINE BENDING: CPT

## 2025-03-07 PROCEDURE — 99214 OFFICE O/P EST MOD 30 MIN: CPT | Performed by: NURSE PRACTITIONER

## 2025-03-07 RX ORDER — TIZANIDINE HYDROCHLORIDE 2 MG/1
2 CAPSULE, GELATIN COATED ORAL 3 TIMES DAILY
Qty: 30 CAPSULE | Refills: 0 | Status: SHIPPED | OUTPATIENT
Start: 2025-03-07

## 2025-03-07 NOTE — PROGRESS NOTES
AZRA Jose  [facility]   [facility] Medicine  [address]  [place] [address]  Phone: [phone]  Fax: [phone]         Chief Complaint:  Chief Complaint   Patient presents with    Back Pain        History:  Lisa Manrique is a 34 y.o. female that is an established patient. She  is here for evaluation of the above complaint.    Back Pain       History of Present Illness  The patient presents for evaluation of back pain.    She has been experiencing persistent back pain since 09/2024, which has not shown significant improvement despite undergoing a steroid pack treatment. She sought a referral for physical therapy (PT), suspecting a slipped disc. After approximately 3 months of PT, she experienced intermittent periods of improvement and worsening of her condition. By 12/2024, she had acquired the necessary tools to manage her condition independently and discontinued PT. She experienced a flare-up of her symptoms about a month ago, prompting her to seek chiropractic care. She has been attending weekly chiropractic sessions for the past month, which have been beneficial. She even went skiing, started sleeping on a hard mattress, and resumed exercise, including lifting and biking. However, an incident occurred on Saturday when she bent down to brush crumbs off a seat and felt a pop in her back. Despite using a back pillow and reclining the car seat during a 3-hour drive, she experienced significant discomfort while sitting and standing at a show. Her condition improved after a night's rest but remained severe.     She had a chiropractic appointment on Monday and by Wednesday, her condition had significantly improved. She was advised to strengthen her core to support her back. On Wednesday night, she performed a basic transverse abdominis workout but stopped when she felt fatigued. She felt well throughout the day until she bent over to  a laundry basket and felt a pop in her  "back. This time, the pain was so acute that she nearly passed out, experiencing lightheadedness and nausea. Last night, her pain was the worst it has been since 08/2024, with significant spasming. Her pain has always been more pronounced on the right side, but yesterday, she also felt it on the left side. This morning, her pain has improved but remains severe. She reports no changes in bowel or bladder function. She took ibuprofen yesterday, which did not provide relief. She has not taken any medication today. She applied heat to her back last night before going out for dinner. She reports no fever. Walking seems to alleviate her pain slightly.    No saddle anesthesia, no change in bowel or bladder function. She does have some sciatica down the right leg.     Supplemental Information  She is still dealing with infertility issues and has been working with Dr. Cheng. She did 4 rounds of IUI and was advised to see a reproductive endocrinologist.  She also had an endometrial biopsy there and went down to [place] for a saline ultrasound. Everything showed nothing abnormal.           Results         ROS:  Review of Systems   Constitutional: Negative.    HENT: Negative.     Respiratory: Negative.     Musculoskeletal:  Positive for back pain.         reports that she has never smoked. She has never used smokeless tobacco. She reports that she does not drink alcohol and does not use drugs.    Current Outpatient Medications   Medication Instructions    multivitamin with minerals tablet tablet 1 tablet, Daily    NON FORMULARY Heart and Soil supplement    TiZANidine (ZANAFLEX) 2 mg, Oral, 3 Times Daily       OBJECTIVE:  /66   Pulse 83   Temp 96.8 °F (36 °C)   Ht 165.1 cm (65\")   Wt 64.4 kg (142 lb)   SpO2 100%   BMI 23.63 kg/m²    Physical Exam  Vitals and nursing note reviewed.   Constitutional:       Appearance: Normal appearance.   HENT:      Head: Normocephalic and atraumatic.      Nose: Nose normal. "   Eyes:      Conjunctiva/sclera: Conjunctivae normal.   Cardiovascular:      Rate and Rhythm: Normal rate and regular rhythm.   Pulmonary:      Effort: Pulmonary effort is normal. No respiratory distress.      Breath sounds: Normal breath sounds. No wheezing or rales.   Musculoskeletal:      Lumbar back: Spasms present. No swelling or bony tenderness. Decreased range of motion. Positive right straight leg raise test and positive left straight leg raise test.   Neurological:      General: No focal deficit present.      Mental Status: She is alert and oriented to person, place, and time.   Psychiatric:         Mood and Affect: Mood normal.         Behavior: Behavior normal.       Physical Exam  Straight leg test was performed.    Procedures    Assessment/Plan:     Diagnoses and all orders for this visit:    1. Acute right-sided low back pain without sciatica (Primary)  -     XR Spine Lumbar Complete With Flex & Ext; Future  -     Ambulatory Referral to Sports Medicine  -     TiZANidine (ZANAFLEX) 2 MG capsule; Take 1 capsule by mouth 3 (Three) Times a Day.  Dispense: 30 capsule; Refill: 0  -     MRI Lumbar Spine Without Contrast; Future      BMI is within normal parameters. No other follow-up for BMI required.     Assessment & Plan  1. Back pain.  The patient has been experiencing persistent back pain since 09/2024, which has not shown significant improvement despite undergoing a steroid pack treatment. She sought a referral for physical therapy (PT), suspecting a slipped disc. After approximately 3 months of PT, she experienced intermittent periods of improvement and worsening of her condition. By 12/2024, she had acquired the necessary tools to manage her condition independently and discontinued PT. She experienced a flare-up of her symptoms about a month ago, prompting her to seek chiropractic care. She has been attending weekly chiropractic sessions for the past month, which have been beneficial. She even went  skiing, started sleeping on a hard mattress, and resumed exercise, including lifting and biking. However, an incident occurred on Saturday when she bent down to brush crumbs off a seat and felt a pop in her back. Despite using a back pillow and reclining the car seat during a 3-hour drive, she experienced significant discomfort while sitting and standing at a show. Her condition improved after a night's rest but remained severe. She had a chiropractic appointment on Monday and by Wednesday, her condition had significantly improved. She was advised to strengthen her core to support her back. On Wednesday night, she performed a basic transverse abdominis workout but stopped when she felt fatigued. She felt well throughout the day until she bent over to  a laundry basket and felt a pop in her back. This time, the pain was so acute that she nearly passed out, experiencing lightheadedness and nausea. Last night, her pain was the worst it has been since 08/2024, with significant spasming. Her pain has always been more pronounced on the right side, but yesterday, she also felt it on the left side. This morning, her pain has improved but remains severe. She reports no changes in bowel or bladder function. She took ibuprofen yesterday, which did not provide relief. She has not taken any medication today. She applied heat to her back last night before going out for dinner. She reports no fever. An x-ray will be conducted today, followed by an MRI pending insurance approval. A referral to nonsurgical orthopedics at [facility] has been made. A prescription for a muscle relaxant has been provided, with instructions to take it up to three times daily. She has been advised against driving post-administration due to potential drowsiness. She has been instructed to apply ice to her back for 10 to 15 minutes, three times daily, and to use heat intermittently. She has declined the use of meloxicam and opted to continue with  ibuprofen. If the MRI results indicate a need, a referral to neurosurgery or spinal surgery may be considered.        An After Visit Summary was printed and given to the patient at discharge.  No follow-ups on file.       There are no Patient Instructions on file for this visit.      Discussion:     I spent 32 minutes caring for Lisa on this date of service. This time includes time spent by me in the following activities: preparing for the visit, reviewing tests, performing a medically appropriate examination and/or evaluation, counseling and educating the patient/family/caregiver, referring and communicating with other health care professionals, documenting information in the medical record, independently interpreting results and communicating that information with the patient/family/caregiver, care coordination, ordering medications, ordering test(s), obtaining a separately obtained history, and reviewing a separately obtained history   Patient or patient representative verbalized consent for the use of Ambient Listening during the visit with  AZRA Jose for chart documentation. 3/7/2025  14:07 CST    Cierra OQUENDO 3/7/2025   Electronically signed.

## 2025-03-10 ENCOUNTER — OFFICE VISIT (OUTPATIENT)
Age: 35
End: 2025-03-10
Payer: COMMERCIAL

## 2025-03-10 VITALS — BODY MASS INDEX: 23.66 KG/M2 | HEIGHT: 65 IN | RESPIRATION RATE: 18 BRPM | WEIGHT: 142 LBS

## 2025-03-10 DIAGNOSIS — M62.830 MUSCLE SPASM OF BACK: ICD-10-CM

## 2025-03-10 DIAGNOSIS — M54.16 LUMBAR RADICULOPATHY: Primary | ICD-10-CM

## 2025-03-10 RX ORDER — KETOROLAC TROMETHAMINE 15 MG/ML
30 INJECTION, SOLUTION INTRAMUSCULAR; INTRAVENOUS ONCE
Status: COMPLETED | OUTPATIENT
Start: 2025-03-10 | End: 2025-03-10

## 2025-03-10 RX ORDER — TRIAMCINOLONE ACETONIDE 40 MG/ML
60 INJECTION, SUSPENSION INTRA-ARTICULAR; INTRAMUSCULAR ONCE
Status: COMPLETED | OUTPATIENT
Start: 2025-03-10 | End: 2025-03-10

## 2025-03-10 RX ORDER — METHYLPREDNISOLONE 4 MG/1
TABLET ORAL
Qty: 21 TABLET | Refills: 0 | Status: SHIPPED | OUTPATIENT
Start: 2025-03-10

## 2025-03-10 RX ADMIN — TRIAMCINOLONE ACETONIDE 60 MG: 40 INJECTION, SUSPENSION INTRA-ARTICULAR; INTRAMUSCULAR at 09:01

## 2025-03-10 RX ADMIN — KETOROLAC TROMETHAMINE 30 MG: 15 INJECTION, SOLUTION INTRAMUSCULAR; INTRAVENOUS at 09:00

## 2025-03-10 NOTE — PROGRESS NOTES
[facility] Medicine  Smith Frias MD, PhD  Robbin Frias PA-C    Chief Complaint:   Chief Complaint   Patient presents with    Lower Back - Initial Evaluation     Patient presents today for lumbar pain since August. X-rays performed at [facility] on 2025. Patient has had done physical therapy for 3 months and chiropractor therapy. Patient complains of intermittent pain/ popping.         History of Present Illness:   The patient is a pleasant 34-year-old active female who presents with acute right sided lower back pain.  This has been ongoing on and off for between 3 and 6 months.  She has recently completed a course of physical therapy at [facility] for over 3 months.  She has also undergone several chiropractic treatments as well.  She states she was simply bending over to  an empty laundry basket when she had severe lower back pain to the point of nearly passing out.  She states that her symptoms have not resolved over the last few days with use of NSAIDs and a recent muscle relaxer prescribed by her primary care provider.  She states she has severe pain with prolonged sitting and prolonged standing and walking.  She states the pain is along the posterolateral aspect of her back and posterior hip and radiates down to the anterior aspect of her hip at times.  She denies any numbness to her lower extremities.  She denies any bowel or bladder changes, saddle anesthesia, or constitutional symptoms.  She had already seen her primary care provider for this who did order an MRI which has not been completed yet.  She is here today for further evaluation.     Referring Provider: Ceirra Mast APRN     Past Medical History:   Past Medical History:   Diagnosis Date    Menstrual problem         Past Surgical History:  Past Surgical History:   Procedure Laterality Date     SECTION  2019        Social History:   Social History     Socioeconomic History    Marital status:     Tobacco Use    Smoking status: Never    Smokeless tobacco: Never   Vaping Use    Vaping status: Never Used   Substance and Sexual Activity    Alcohol use: Never    Drug use: Never    Sexual activity: Yes     Partners: Male     Birth control/protection: None        Medications:  Current Outpatient Medications   Medication Instructions    methylPREDNISolone (MEDROL) 4 MG dose pack Take as directed on package instructions.    multivitamin with minerals tablet tablet 1 tablet, Daily    NON FORMULARY Heart and Soil supplement    TiZANidine (ZANAFLEX) 2 mg, Oral, 3 Times Daily        Allergies:  Allergies   Allergen Reactions    Penicillins Unknown - Low Severity       Vital Signs:  Vitals:    03/10/25 0807   Resp: 18     Body mass index is 23.63 kg/m².     Review of Systems:   Review of Systems    Physical Exam:  Vital signs reviewed.   General: No acute distress. Cooperative with exam.   Eyes: conjunctiva clear; pupils equally round and reactive  ENT: external ears and nose atraumatic; oropharynx clear  CV: no peripheral edema, 2+ distal pulses  Resp: normal respiratory effort, no use of accessory muscles  Skin: no rashes or wounds; normal turgor  Psych: mood and affect appropriate; recent and remote memory intact  Neuro: sensation to light touch intact, no focal neurological deficit  Musculoskeletal: On inspection the patient is in a standing position with obvious discomfort.  She has a lumbar spine shift away from the area of pain when standing.  She has trouble with single-leg stance on the right side.  She has significant pain with any lumbar flexion when leaning forward.  Pain is mildly alleviated with going to an extended position.  Patient has pain that is worsened significantly with getting into a sitting position.  She has weakness in the gluteal musculature, abductor musculature, and pain with hip flexion.  Knee and ankle range of motion is intact.  There is tenderness to palpation along SI joint paraspinal  musculature on the right side.  No significant midline tenderness is present.  Neurovascular intact right lower extremity    Physical Exam     Results Review:   XR --  XR - 1 Result   I have personally reviewed Results for orders placed during the hospital encounter of 03/07/25    XR SPINE LUMBAR COMPLETE W FLEX EXT 3/7/2025   and my interpretation of the image is as follows: I reviewed an AP lateral flexion extension of the lumbar spine which did not demonstrate any acute osseous abnormality.  Disc spaces appear to be relatively well-maintained.  There may be pseudo scoliosis secondary to muscle spasming present.  Image quality is adequate    Assessment:   Diagnoses and all orders for this visit:    1. Lumbar radiculopathy (Primary)  -     triamcinolone acetonide (KENALOG-40) injection 60 mg  -     methylPREDNISolone (MEDROL) 4 MG dose pack; Take as directed on package instructions.  Dispense: 21 tablet; Refill: 0  -     ketorolac (TORADOL) injection 30 mg    2. Muscle spasm of back  -     triamcinolone acetonide (KENALOG-40) injection 60 mg  -     methylPREDNISolone (MEDROL) 4 MG dose pack; Take as directed on package instructions.  Dispense: 21 tablet; Refill: 0  -     ketorolac (TORADOL) injection 30 mg         Plan:  The patient has acute exacerbation of chronic right sided lower back pain with radicular symptoms down the right leg.  This is concerning for lumbar facet issues and/or discogenic lower back pain.  I am suspicious that the patient has an acute disc herniation and I do feel that an MRI at this point is necessary.  The patient has tried and failed several conservative treatment steps over the last 6 months to include use of NSAIDs, rest from exacerbating activities, and a long recent course of formal physical therapy at [facility] using the Anaid method.  She continues to do home exercises but states that this is the most severe her symptoms have been.  She has also tried chiropractic treatments  "in the past as well.  Her MRI was already ordered by her primary care provider and I will plan to see her back to discuss the results of the MRI.  We have discussed in the future the potential need for lumbar epidural steroid injections and/or facet injections.  We have discussed the possibility of referral to a neurosurgical specialist if necessary.  For more acute pain control we have given the patient an intramuscular Toradol injection and an intramuscular triamcinolone injection.  I have also prescribed her Medrol Dosepak for her to have on hand if necessary, if her symptoms do not start to improve over the next few days.  The patient is agreeable with this plan, all questions were answered.     Follow-Up:   Return if symptoms worsen or fail to improve.     Procedure:  Procedures     Procedure Note for Intramuscular (IM) Corticosteroid and Toradol Injections    The intramuscular corticosteroid and Toradol injections were discussed with the patient in detail, including indications, risks, benefits, and alternatives.  Risks include but are not limited to: incomplete symptom resolution, injection site pain, local irritation, bleeding, infection, allergic reaction, gastrointestinal discomfort, kidney injury, elevated blood pressure and blood sugar.  Verbal consent was given for the procedure.  These injections were performed by the medical assistant under the direction of the medical provider.  The upper outer aspect of the patient's right gluteal region was then cleansed with an alcohol swab.  A corticosteroid injection was performed in the area using a 25-gauge 1.5\" needle.  The upper outer aspect of the patient's left gluteal region was then cleansed with an alcohol swab.  A Toradol injection was performed in the area using a 25-gauge 1.5\" needle.  The patient tolerated the procedure well without complication and a bandage was applied to the injection sites.       Injection medication(s):  Triamcinolone 40mg/mL: " 2mL (60mg)  Toradol 30mg/mL: 2mL (60mg)  *excess medication that remained in the bottle post injection was wasted due to single-use vial    Robbin Frias PA-C

## 2025-03-12 ENCOUNTER — HOSPITAL ENCOUNTER (OUTPATIENT)
Dept: MRI IMAGING | Facility: HOSPITAL | Age: 35
Discharge: HOME | End: 2025-03-12
Admitting: NURSE PRACTITIONER
Payer: COMMERCIAL

## 2025-03-12 ENCOUNTER — TELEPHONE (OUTPATIENT)
Age: 35
End: 2025-03-12
Payer: COMMERCIAL

## 2025-03-12 DIAGNOSIS — M54.50 ACUTE RIGHT-SIDED LOW BACK PAIN WITHOUT SCIATICA: Primary | ICD-10-CM

## 2025-03-12 DIAGNOSIS — M54.50 ACUTE RIGHT-SIDED LOW BACK PAIN WITHOUT SCIATICA: ICD-10-CM

## 2025-03-12 PROCEDURE — 72148 MRI LUMBAR SPINE W/O DYE: CPT

## 2025-03-12 NOTE — TELEPHONE ENCOUNTER
Patient was advised to begin medrol dose pack. Patient does have PT appointment tomorrow. Patient was transferred to scheduling for MRI set up.

## 2025-03-12 NOTE — TELEPHONE ENCOUNTER
PATIENT CALLED STATING SHE WAS TOLD ON MONDAY TO KEEP MOVING TO HELP HER BACK. WHICH USUALLY DID HELP WITH HER PAIN. HOWEVER SINCE MONDAY HER PAIN HAS GOTTEN WORSE WITH MOVEMENT AND SHE STATED SHE FEELS BEST WHEN SHE STANDS STILL OR LAYS FLAT ON HER BACK FOR AN EXTENDED AMOUNT OF TIME. SHE IS LOOKING FOR ADVICE ON WHAT SHE SHOULD DO.

## 2025-03-14 ENCOUNTER — PATIENT ROUNDING (BHMG ONLY) (OUTPATIENT)
Age: 35
End: 2025-03-14
Payer: COMMERCIAL

## 2025-03-14 NOTE — PROGRESS NOTES
March 14, 2025    Hello, may I speak with Lisa Manrique?    My name is KRYSTAL      I am  with [facility]  [address] [place], [place] [address] [address] [address] [phone].    Before we get started may I verify your date of birth? 1990    I am calling to officially welcome you to our practice and ask about your recent visit. Is this a good time to talk? yes    Tell me about your visit with us. What things went well?  VISIT WENT WELL. STAFF HAS BEEN A HUGE HELP THIS WEEK AND ANSWERED ALL QUESTIONS WITHIN A TIMELY MANNER.       We're always looking for ways to make our patients' experiences even better. Do you have recommendations on ways we may improve?  no    Overall were you satisfied with your first visit to our practice? yes       I appreciate you taking the time to speak with me today. Is there anything else I can do for you? no      Thank you, and have a great day.

## 2025-03-17 ENCOUNTER — OFFICE VISIT (OUTPATIENT)
Age: 35
End: 2025-03-17
Payer: COMMERCIAL

## 2025-03-17 VITALS — RESPIRATION RATE: 18 BRPM | WEIGHT: 142 LBS | BODY MASS INDEX: 23.66 KG/M2 | HEIGHT: 65 IN

## 2025-03-17 DIAGNOSIS — M54.16 LUMBAR RADICULOPATHY: ICD-10-CM

## 2025-03-17 DIAGNOSIS — M51.360 DEGENERATION OF INTERVERTEBRAL DISC OF LUMBAR REGION WITH DISCOGENIC BACK PAIN: ICD-10-CM

## 2025-03-17 DIAGNOSIS — M62.830 MUSCLE SPASM OF BACK: ICD-10-CM

## 2025-03-17 DIAGNOSIS — M47.816 FACET ARTHROPATHY, LUMBAR: Primary | ICD-10-CM

## 2025-03-17 PROCEDURE — 99213 OFFICE O/P EST LOW 20 MIN: CPT | Performed by: PHYSICIAN ASSISTANT

## 2025-03-17 RX ORDER — METHYLPREDNISOLONE 4 MG/1
TABLET ORAL
Qty: 21 TABLET | Refills: 0 | Status: SHIPPED | OUTPATIENT
Start: 2025-03-17

## 2025-03-17 NOTE — PROGRESS NOTES
[facility] Sports Medicine  Smith Frias MD, PhD  Robbin Frias PA-C    Chief Complaint:   Chief Complaint   Patient presents with    Lower Back - Follow-up     Patient presents to the office today for lumbar MRI results. MRI performed at [facility] on 2025.        History of Present Illness:   The patient is a pleasant 34-year-old who presents with improving symptoms regarding her lumbar spine.  She had an MRI due to the severity of her symptoms and is here today to review the results of this.  She has been in formal physical therapy with [facility] and states that her symptoms have significantly improved since that time.  She is also nearing completion of a Medrol Dosepak.  She denies any new symptoms at today's visit.  Still denies any bowel or bladder changes, saddle anesthesia, radicular symptoms, or constitutional symptoms.  She is planning on going out of town in the next few weeks.    Referring Provider: No ref. provider found     Past Medical History:   Past Medical History:   Diagnosis Date    Menstrual problem         Past Surgical History:  Past Surgical History:   Procedure Laterality Date     SECTION  2019        Social History:   Social History     Socioeconomic History    Marital status:    Tobacco Use    Smoking status: Never    Smokeless tobacco: Never   Vaping Use    Vaping status: Never Used   Substance and Sexual Activity    Alcohol use: Never    Drug use: Never    Sexual activity: Yes     Partners: Male     Birth control/protection: None        Medications:  Current Outpatient Medications   Medication Instructions    methylPREDNISolone (MEDROL) 4 MG dose pack Take as directed on package instructions.    methylPREDNISolone (MEDROL) 4 MG dose pack Take as directed on package instructions.    multivitamin with minerals tablet tablet 1 tablet, Daily    NON FORMULARY Heart and Soil supplement    TiZANidine (ZANAFLEX) 2 mg, Oral, 3 Times Daily         Allergies:  Allergies   Allergen Reactions    Penicillins Unknown - Low Severity       Vital Signs:  Vitals:    03/17/25 0914   Resp: 18     Body mass index is 23.63 kg/m².     Review of Systems:   Review of Systems    Physical Exam:  Vital signs reviewed.   General: No acute distress. Cooperative with exam.   Eyes: conjunctiva clear; pupils equally round and reactive  ENT: external ears and nose atraumatic; oropharynx clear  CV: no peripheral edema, 2+ distal pulses  Resp: normal respiratory effort, no use of accessory muscles  Skin: no rashes or wounds; normal turgor  Psych: mood and affect appropriate; recent and remote memory intact  Neuro: sensation to light touch intact, no focal neurological deficit  Musculoskeletal: On inspection the patient is comfortably in a seated position.  No obvious pelvic shift is present, significantly improved from the prior visit.  Patient is able get from a sitting to a standing position without much discomfort.  She walks with a minimally antalgic gait.  No loss of sensation present to both lower extremities.  There is still mild pain with lumbar flexion actively.    Physical Exam     Results Review:   MRI --  MRI - 1 Result   I have personally reviewed Results for orders placed during the hospital encounter of 03/12/25    MRI LUMBAR SPINE WO CONTRAST 3/12/2025   and my interpretation of the image is as follows: I have reviewed the patient's MRI which demonstrates disc protrusions at L3-L4 and L4-L5.  There is mild facet arthropathy from L3-S1.  No obvious nerve compression is present.  Image quality is adequate    Assessment:   Diagnoses and all orders for this visit:    1. Facet arthropathy, lumbar (Primary)  -     methylPREDNISolone (MEDROL) 4 MG dose pack; Take as directed on package instructions.  Dispense: 21 tablet; Refill: 0    2. Degeneration of intervertebral disc of lumbar region with discogenic back pain  -     methylPREDNISolone (MEDROL) 4 MG dose pack; Take as  directed on package instructions.  Dispense: 21 tablet; Refill: 0    3. Lumbar radiculopathy  -     methylPREDNISolone (MEDROL) 4 MG dose pack; Take as directed on package instructions.  Dispense: 21 tablet; Refill: 0    4. Muscle spasm of back       Plan:  Overall the patient seems to be trending in the right direction.  I have reviewed her MRI at length with her today and we have discussed a long-term plan of care regarding her back pain.  I would like her to continue formal physical therapy working on improving core strength and lumbar mobility and stability.  We have discussed use of a corset style back brace as needed.  As the patient is going out of town in the next few weeks I have given her a Medrol Dosepak to keep on hand in case she needs this for an exacerbation of back pain.  Otherwise she will follow-up on a as needed basis.    Follow-Up:   No follow-ups on file.     Procedure:  Procedures     Robbin Frias PA-C

## 2025-07-10 ENCOUNTER — OFFICE VISIT (OUTPATIENT)
Dept: FAMILY MEDICINE CLINIC | Facility: CLINIC | Age: 35
End: 2025-07-10
Payer: COMMERCIAL

## 2025-07-10 ENCOUNTER — LAB (OUTPATIENT)
Dept: LAB | Facility: HOSPITAL | Age: 35
End: 2025-07-10
Payer: COMMERCIAL

## 2025-07-10 ENCOUNTER — PATIENT MESSAGE (OUTPATIENT)
Dept: FAMILY MEDICINE CLINIC | Facility: CLINIC | Age: 35
End: 2025-07-10

## 2025-07-10 VITALS
DIASTOLIC BLOOD PRESSURE: 80 MMHG | TEMPERATURE: 96.6 F | WEIGHT: 147 LBS | HEART RATE: 68 BPM | SYSTOLIC BLOOD PRESSURE: 120 MMHG | HEIGHT: 65 IN | OXYGEN SATURATION: 99 % | BODY MASS INDEX: 24.49 KG/M2

## 2025-07-10 DIAGNOSIS — Z84.1 FAMILY HISTORY OF KIDNEY DISEASE: Primary | ICD-10-CM

## 2025-07-10 DIAGNOSIS — Z00.00 ANNUAL PHYSICAL EXAM: Primary | ICD-10-CM

## 2025-07-10 DIAGNOSIS — Z00.00 ANNUAL PHYSICAL EXAM: ICD-10-CM

## 2025-07-10 LAB
ALBUMIN SERPL-MCNC: 4.3 G/DL (ref 3.5–5.2)
ALBUMIN/GLOB SERPL: 1.6 G/DL
ALP SERPL-CCNC: 57 U/L (ref 39–117)
ALT SERPL W P-5'-P-CCNC: 11 U/L (ref 1–33)
ANION GAP SERPL CALCULATED.3IONS-SCNC: 10 MMOL/L (ref 5–15)
AST SERPL-CCNC: 19 U/L (ref 1–32)
BILIRUB SERPL-MCNC: 0.4 MG/DL (ref 0–1.2)
BUN SERPL-MCNC: 19 MG/DL (ref 6–20)
BUN/CREAT SERPL: 27.1 (ref 7–25)
CALCIUM SPEC-SCNC: 8.9 MG/DL (ref 8.6–10.5)
CHLORIDE SERPL-SCNC: 103 MMOL/L (ref 98–107)
CHOLEST SERPL-MCNC: 160 MG/DL (ref 0–200)
CO2 SERPL-SCNC: 27 MMOL/L (ref 22–29)
CREAT SERPL-MCNC: 0.7 MG/DL (ref 0.57–1)
EGFRCR SERPLBLD CKD-EPI 2021: 115.1 ML/MIN/1.73
GLOBULIN UR ELPH-MCNC: 2.7 GM/DL
GLUCOSE SERPL-MCNC: 68 MG/DL (ref 65–99)
HDLC SERPL-MCNC: 80 MG/DL (ref 40–60)
LDLC SERPL CALC-MCNC: 67 MG/DL (ref 0–100)
LDLC/HDLC SERPL: 0.84 {RATIO}
POTASSIUM SERPL-SCNC: 4.3 MMOL/L (ref 3.5–5.2)
PROT SERPL-MCNC: 7 G/DL (ref 6–8.5)
SODIUM SERPL-SCNC: 140 MMOL/L (ref 136–145)
TRIGL SERPL-MCNC: 64 MG/DL (ref 0–150)
VLDLC SERPL-MCNC: 13 MG/DL (ref 5–40)

## 2025-07-10 PROCEDURE — 80053 COMPREHEN METABOLIC PANEL: CPT

## 2025-07-10 PROCEDURE — 99395 PREV VISIT EST AGE 18-39: CPT | Performed by: FAMILY MEDICINE

## 2025-07-10 PROCEDURE — 90715 TDAP VACCINE 7 YRS/> IM: CPT | Performed by: FAMILY MEDICINE

## 2025-07-10 PROCEDURE — 36415 COLL VENOUS BLD VENIPUNCTURE: CPT

## 2025-07-10 PROCEDURE — 90471 IMMUNIZATION ADMIN: CPT | Performed by: FAMILY MEDICINE

## 2025-07-10 PROCEDURE — 80061 LIPID PANEL: CPT

## 2025-07-16 NOTE — PROGRESS NOTES
"CC:   Chief Complaint   Patient presents with    Annual Exam       History:  Lisa Manrique is a 34 y.o. female who presents today for follow-up for evaluation of the above:    History of Present Illness  Patient is here for annual exam, physically things have felt well, interested in lab draws, says she has a family history of IgA nephropathy and is concerned about kidney function    Past Medical History:   Diagnosis Date    Menstrual problem      Past Surgical History:   Procedure Laterality Date     SECTION  2019     Family History   Problem Relation Age of Onset    Obesity Father     Hypertension Father     Kidney disease Father     Diabetes Paternal Grandmother     Miscarriages / Stillbirths Maternal Grandmother             Ms. Manrique  reports that she has never smoked. She has never used smokeless tobacco. She reports that she does not drink alcohol and does not use drugs.      Current Outpatient Medications:     multivitamin with minerals tablet tablet, Take 1 tablet by mouth Daily., Disp: , Rfl:       OBJECTIVE:  /80 (BP Location: Right arm, Patient Position: Sitting, Cuff Size: Adult)   Pulse 68   Temp 96.6 °F (35.9 °C) (Temporal)   Ht 165.1 cm (65\")   Wt 66.7 kg (147 lb)   SpO2 99%   BMI 24.46 kg/m²    Physical Exam  Constitutional:       General: She is not in acute distress.     Appearance: Normal appearance. She is not ill-appearing or diaphoretic.   HENT:      Head: Normocephalic and atraumatic.      Right Ear: Tympanic membrane and external ear normal.      Left Ear: Tympanic membrane and external ear normal.      Nose: Nose normal. No congestion or rhinorrhea.      Mouth/Throat:      Mouth: Mucous membranes are moist.      Pharynx: Oropharynx is clear. No oropharyngeal exudate or posterior oropharyngeal erythema.   Eyes:      General: No scleral icterus.        Right eye: No discharge.         Left eye: No discharge.      Extraocular Movements: Extraocular movements intact.      " Conjunctiva/sclera: Conjunctivae normal.      Pupils: Pupils are equal, round, and reactive to light.   Neck:      Thyroid: No thyromegaly.      Vascular: No JVD.   Cardiovascular:      Rate and Rhythm: Normal rate and regular rhythm.      Pulses: Normal pulses.      Heart sounds: Normal heart sounds. No murmur heard.     No friction rub. No gallop.   Pulmonary:      Effort: Pulmonary effort is normal.      Breath sounds: Normal breath sounds.   Abdominal:      General: Bowel sounds are normal. There is no distension.      Palpations: Abdomen is soft. There is no mass.      Tenderness: There is no abdominal tenderness. There is no guarding or rebound.   Musculoskeletal:         General: No deformity or signs of injury.      Cervical back: Neck supple.      Right lower leg: No edema.      Left lower leg: No edema.   Lymphadenopathy:      Cervical: No cervical adenopathy.   Skin:     General: Skin is warm and dry.      Capillary Refill: Capillary refill takes less than 2 seconds.      Coloration: Skin is not jaundiced or pale.   Neurological:      Mental Status: She is alert and oriented to person, place, and time. Mental status is at baseline.   Psychiatric:         Mood and Affect: Mood normal.         Behavior: Behavior normal.         Thought Content: Thought content normal.         Judgment: Judgment normal.         Assessment/Plan     Diagnosis Plan   1. Annual physical exam  Comprehensive Metabolic Panel    Lipid panel    Tdap Vaccine => 6yo IM (BOOSTRIX/ADACEL)        Assessment & Plan  Preventative: Tdap above with screening labs       An After Visit Summary was printed and given to the patient at discharge.  Return in about 1 year (around 7/10/2026). Sooner if problems arise.         Mustapha Sharma D.O.  Family Medicine  Osteopathic Neuromusculoskeletal Medicine

## 2025-08-02 PROCEDURE — 87081 CULTURE SCREEN ONLY: CPT | Performed by: NURSE PRACTITIONER
